# Patient Record
Sex: MALE | Race: WHITE | NOT HISPANIC OR LATINO | Employment: FULL TIME | URBAN - NONMETROPOLITAN AREA
[De-identification: names, ages, dates, MRNs, and addresses within clinical notes are randomized per-mention and may not be internally consistent; named-entity substitution may affect disease eponyms.]

---

## 2017-04-10 DIAGNOSIS — E29.1 TESTICULAR HYPOFUNCTION: Primary | ICD-10-CM

## 2017-04-10 DIAGNOSIS — N40.0 BENIGN NON-NODULAR PROSTATIC HYPERPLASIA, PRESENCE OF LOWER URINARY TRACT SYMPTOMS UNSPECIFIED: ICD-10-CM

## 2017-04-12 LAB
BASOPHILS # BLD AUTO: 0.03 10*3/MM3 (ref 0–0.2)
BASOPHILS NFR BLD AUTO: 0.6 % (ref 0–2.5)
CONV COMMENT: ABNORMAL
EOSINOPHIL # BLD AUTO: 0.24 10*3/MM3 (ref 0–0.7)
EOSINOPHIL NFR BLD AUTO: 4.6 % (ref 0–7)
ERYTHROCYTE [DISTWIDTH] IN BLOOD BY AUTOMATED COUNT: 12.7 % (ref 11.5–14.5)
ESTRADIOL SERPL-MCNC: <5 PG/ML (ref 7.6–42.6)
HCT VFR BLD AUTO: 51.3 % (ref 42–52)
HGB BLD-MCNC: 17.3 G/DL (ref 14–18)
IMM GRANULOCYTES # BLD: 0.02 10*3/MM3 (ref 0–0.06)
IMM GRANULOCYTES NFR BLD: 0.4 % (ref 0–0.6)
LYMPHOCYTES # BLD AUTO: 1.81 10*3/MM3 (ref 0.6–3.4)
LYMPHOCYTES NFR BLD AUTO: 34.6 % (ref 10–50)
MCH RBC QN AUTO: 30.7 PG (ref 27–31)
MCHC RBC AUTO-ENTMCNC: 33.7 G/DL (ref 30–37)
MCV RBC AUTO: 91 FL (ref 80–94)
MONOCYTES # BLD AUTO: 0.46 10*3/MM3 (ref 0–0.9)
MONOCYTES NFR BLD AUTO: 8.8 % (ref 0–12)
NEUTROPHILS # BLD AUTO: 2.67 10*3/MM3 (ref 2–6.9)
NEUTROPHILS NFR BLD AUTO: 51 % (ref 37–80)
NRBC BLD AUTO-RTO: 0 /100 WBC (ref 0–0)
PLATELET # BLD AUTO: 177 10*3/MM3 (ref 130–400)
PSA SERPL-MCNC: 0.59 NG/ML (ref 0.06–4)
RBC # BLD AUTO: 5.64 10*6/MM3 (ref 4.7–6.1)
TESTOST SERPL-MCNC: 150 NG/DL (ref 348–1197)
WBC # BLD AUTO: 5.23 10*3/MM3 (ref 4.8–10.8)

## 2017-04-18 ENCOUNTER — OFFICE VISIT (OUTPATIENT)
Dept: UROLOGY | Facility: CLINIC | Age: 47
End: 2017-04-18

## 2017-04-18 DIAGNOSIS — N13.8 BPH (BENIGN PROSTATIC HYPERTROPHY) WITH URINARY OBSTRUCTION: ICD-10-CM

## 2017-04-18 DIAGNOSIS — E29.1 HYPOGONADISM MALE: Primary | ICD-10-CM

## 2017-04-18 DIAGNOSIS — N40.1 BPH (BENIGN PROSTATIC HYPERTROPHY) WITH URINARY OBSTRUCTION: ICD-10-CM

## 2017-04-18 PROCEDURE — 99213 OFFICE O/P EST LOW 20 MIN: CPT | Performed by: UROLOGY

## 2017-04-18 RX ORDER — LISINOPRIL 30 MG/1
TABLET ORAL
COMMUNITY
Start: 2017-01-23

## 2017-04-18 NOTE — PROGRESS NOTES
Chief Complaint  Hypogonadism (6 MONTHS)        NIA Obregon is a 46 y.o. male who has hypogonadism and low testosterone level having received great clinical benefit from supplement and anxious to continue.  His recent blood work included a normal PSA estradiol and CBC suggesting it is safe to continue the supplement.  He is still inadequately replaced with a testosterone level of 150.    There were no vitals filed for this visit.    Past Medical History  Past Medical History:   Diagnosis Date   • Hypogonadism in male        Past Surgical History  No past surgical history on file.    Medications  has a current medication list which includes the following prescription(s): aspirin, vitamin d3, coenzyme q10, hydrochlorothiazide, lisinopril, lisinopril, omeprazole, multivitamin with fluoride/iron, rosuvastatin, sildenafil, tadalafil, and testosterone cypionate.      Allergies  Allergies   Allergen Reactions   • Penicillins Rash       Social History  Social History     Social History Narrative       Family History  He has no family history of bladder or kidney cancer  He has no family history of kidney stones      AUA Symptom Score:      Review of Systems  Review of Systems   Constitutional: Negative.    Genitourinary: Negative.    All other systems reviewed and are negative.      Physical Exam  Physical Exam    Labs Recent and today in the office:  Results for orders placed or performed in visit on 04/10/17   Testosterone   Result Value Ref Range    Testosterone, Total 150 (L) 348 - 1197 ng/dL    Comment Comment    Estradiol   Result Value Ref Range    Estradiol <5.0 (L) 7.6 - 42.6 pg/mL   PSA   Result Value Ref Range    PSA 0.591 0.060 - 4.000 ng/mL   CBC & Differential   Result Value Ref Range    WBC 5.23 4.80 - 10.80 10*3/mm3    RBC 5.64 4.70 - 6.10 10*6/mm3    Hemoglobin 17.3 14.0 - 18.0 g/dL    Hematocrit 51.3 42.0 - 52.0 %    MCV 91.0 80.0 - 94.0 fL    MCH 30.7 27.0 - 31.0 pg    MCHC 33.7 30.0 - 37.0 g/dL    RDW  12.7 11.5 - 14.5 %    Platelets 177 130 - 400 10*3/mm3    Neutrophil Rel % 51.0 37.0 - 80.0 %    Lymphocyte Rel % 34.6 10.0 - 50.0 %    Monocyte Rel % 8.8 0.0 - 12.0 %    Eosinophil Rel % 4.6 0.0 - 7.0 %    Basophil Rel % 0.6 0.0 - 2.5 %    Neutrophils Absolute 2.67 2.00 - 6.90 10*3/mm3    Lymphocytes Absolute 1.81 0.60 - 3.40 10*3/mm3    Monocytes Absolute 0.46 0.00 - 0.90 10*3/mm3    Eosinophils Absolute 0.24 0.00 - 0.70 10*3/mm3    Basophils Absolute 0.03 0.00 - 0.20 10*3/mm3    Immature Granulocyte Rel % 0.4 0.0 - 0.6 %    Immature Grans Absolute 0.02 0.00 - 0.06 10*3/mm3    nRBC 0.0 0.0 - 0.0 /100 WBC         Assessment & Plan  He obtains the supplement through mail order from the compounded pharmacy and I suggested increasing the strength to 150 mg daily.  He is also encouraged to be a blood donor since his hemoglobin is getting a little high.  If he doesn't he'll probably need a therapeutic phlebotomy on his return.

## 2017-10-17 DIAGNOSIS — R79.89 LOW TESTOSTERONE: Primary | ICD-10-CM

## 2017-10-17 DIAGNOSIS — E29.1 HYPOGONADISM IN MALE: ICD-10-CM

## 2017-10-17 DIAGNOSIS — Z12.5 SCREENING PSA (PROSTATE SPECIFIC ANTIGEN): ICD-10-CM

## 2017-10-18 ENCOUNTER — OFFICE VISIT (OUTPATIENT)
Dept: UROLOGY | Facility: CLINIC | Age: 47
End: 2017-10-18

## 2017-10-18 VITALS
HEIGHT: 72 IN | TEMPERATURE: 97.6 F | OXYGEN SATURATION: 96 % | DIASTOLIC BLOOD PRESSURE: 80 MMHG | BODY MASS INDEX: 29.66 KG/M2 | HEART RATE: 100 BPM | SYSTOLIC BLOOD PRESSURE: 140 MMHG | WEIGHT: 219 LBS

## 2017-10-18 DIAGNOSIS — E29.1 HYPOGONADISM IN MALE: Primary | ICD-10-CM

## 2017-10-18 LAB
BASOPHILS # BLD AUTO: 0.05 10*3/MM3 (ref 0–0.2)
BASOPHILS NFR BLD AUTO: 0.7 % (ref 0–2.5)
BILIRUB BLD-MCNC: NEGATIVE MG/DL
CLARITY, POC: CLEAR
COLOR UR: YELLOW
EOSINOPHIL # BLD AUTO: 0.41 10*3/MM3 (ref 0–0.7)
EOSINOPHIL NFR BLD AUTO: 5.5 % (ref 0–7)
ERYTHROCYTE [DISTWIDTH] IN BLOOD BY AUTOMATED COUNT: 13.1 % (ref 11.5–14.5)
ESTRADIOL SERPL-MCNC: 8.1 PG/ML (ref 7.6–42.6)
GLUCOSE UR STRIP-MCNC: NEGATIVE MG/DL
HCT VFR BLD AUTO: 48.5 % (ref 42–52)
HGB BLD-MCNC: 16.4 G/DL (ref 14–18)
IMM GRANULOCYTES # BLD: 0.04 10*3/MM3 (ref 0–0.06)
IMM GRANULOCYTES NFR BLD: 0.5 % (ref 0–0.6)
KETONES UR QL: NEGATIVE
LEUKOCYTE EST, POC: NEGATIVE
LYMPHOCYTES # BLD AUTO: 1.2 10*3/MM3 (ref 0.6–3.4)
LYMPHOCYTES NFR BLD AUTO: 16.2 % (ref 10–50)
MCH RBC QN AUTO: 30.4 PG (ref 27–31)
MCHC RBC AUTO-ENTMCNC: 33.8 G/DL (ref 30–37)
MCV RBC AUTO: 89.8 FL (ref 80–94)
MONOCYTES # BLD AUTO: 0.72 10*3/MM3 (ref 0–0.9)
MONOCYTES NFR BLD AUTO: 9.7 % (ref 0–12)
NEUTROPHILS # BLD AUTO: 4.99 10*3/MM3 (ref 2–6.9)
NEUTROPHILS NFR BLD AUTO: 67.4 % (ref 37–80)
NITRITE UR-MCNC: NEGATIVE MG/ML
NRBC BLD AUTO-RTO: 0 /100 WBC (ref 0–0)
PH UR: 6 [PH] (ref 5–8)
PLATELET # BLD AUTO: 174 10*3/MM3 (ref 130–400)
PROT UR STRIP-MCNC: NEGATIVE MG/DL
PSA SERPL-MCNC: 0.66 NG/ML (ref 0.06–4)
RBC # BLD AUTO: 5.4 10*6/MM3 (ref 4.7–6.1)
RBC # UR STRIP: NEGATIVE /UL
SP GR UR: 1.01 (ref 1–1.03)
TESTOST SERPL-MCNC: 520 NG/DL (ref 264–916)
UROBILINOGEN UR QL: NORMAL
WBC # BLD AUTO: 7.41 10*3/MM3 (ref 4.8–10.8)

## 2017-10-18 PROCEDURE — 99213 OFFICE O/P EST LOW 20 MIN: CPT | Performed by: UROLOGY

## 2017-10-18 PROCEDURE — 81003 URINALYSIS AUTO W/O SCOPE: CPT | Performed by: UROLOGY

## 2017-10-18 NOTE — PROGRESS NOTES
Chief Complaint  Hypogonadism (6 month follow-up)        NIA Obregon is a 46 y.o. male who returns today for follow-up of hypogonadism and low testosterone level having achieved a significant clinical benefit from supplement and anxious to continue.  He is currently using the topical compounded gel at 150 mg daily with a good result.  His testosterone increased from 120 to  520 with increase strength to 15%.  Blood work included an estradiol CBC and PSA that were all good and indicated it is safe to continue the supplement.  He has a tendency for polycythemia and donates blood to [facility].    Vitals:    10/18/17 1454   BP: 140/80   Pulse: 100   Temp: 97.6 °F (36.4 °C)   SpO2: 96%       Past Medical History  Past Medical History:   Diagnosis Date   • Hypogonadism in male        Past Surgical History  No past surgical history on file.    Medications  has a current medication list which includes the following prescription(s): aspirin, vitamin d3, coenzyme q10, hydrochlorothiazide, lisinopril, lisinopril, omeprazole, multivitamin with fluoride/iron, rosuvastatin, sildenafil, tadalafil, and testosterone cypionate.      Allergies  Allergies   Allergen Reactions   • Penicillins Rash       Social History  Social History     Social History Narrative       Family History  He has no family history of bladder or kidney cancer  He has no family history of kidney stones      AUA Symptom Score:      Review of Systems  Review of Systems    Physical Exam  Physical Exam   Constitutional: He is oriented to person, place, and time. He appears well-developed and well-nourished.   HENT:   Head: Normocephalic and atraumatic.   Neck: Normal range of motion.   Pulmonary/Chest: Effort normal. No respiratory distress.   Abdominal: Soft. He exhibits no distension and no mass. There is no tenderness. No hernia.   Genitourinary: Rectum normal and prostate normal.   Musculoskeletal: Normal range of motion.   Lymphadenopathy:      He has no cervical adenopathy.   Neurological: He is alert and oriented to person, place, and time.   Skin: Skin is warm and dry.   Psychiatric: He has a normal mood and affect. His behavior is normal.   Vitals reviewed.      Labs Recent and today in the office:  Results for orders placed or performed in visit on 10/18/17   POC Urinalysis Dipstick, Automated   Result Value Ref Range    Color Yellow Yellow, Straw, Dark Yellow, Ana    Clarity, UA Clear Clear    Glucose, UA Negative Negative, 1000 mg/dL (3+) mg/dL    Bilirubin Negative Negative    Ketones, UA Negative Negative    Specific Gravity  1.015 1.005 - 1.030    Blood, UA Negative Negative    pH, Urine 6.0 5.0 - 8.0    Protein, POC Negative Negative mg/dL    Urobilinogen, UA Normal Normal    Leukocytes Negative Negative    Nitrite, UA Negative Negative         Assessment & Plan  Hypogonadism: Is responding well to supplement and we'll continue 150 mg daily applied topically with the compounded gel.

## 2018-04-13 ENCOUNTER — LAB (OUTPATIENT)
Dept: UROLOGY | Facility: CLINIC | Age: 48
End: 2018-04-13

## 2018-04-13 DIAGNOSIS — Z12.5 SCREENING FOR PROSTATE CANCER: ICD-10-CM

## 2018-04-13 DIAGNOSIS — E29.1 TESTICULAR HYPOGONADISM: Primary | ICD-10-CM

## 2018-04-14 LAB
BASOPHILS # BLD AUTO: 0.04 10*3/MM3 (ref 0–0.2)
BASOPHILS NFR BLD AUTO: 0.7 % (ref 0–2.5)
EOSINOPHIL # BLD AUTO: 0.23 10*3/MM3 (ref 0–0.7)
EOSINOPHIL NFR BLD AUTO: 4.3 % (ref 0–7)
ERYTHROCYTE [DISTWIDTH] IN BLOOD BY AUTOMATED COUNT: 12.2 % (ref 11.5–14.5)
ESTRADIOL SERPL-MCNC: 9.9 PG/ML (ref 7.6–42.6)
HCT VFR BLD AUTO: 47.4 % (ref 42–52)
HGB BLD-MCNC: 16.4 G/DL (ref 14–18)
IMM GRANULOCYTES # BLD: 0.02 10*3/MM3 (ref 0–0.06)
IMM GRANULOCYTES NFR BLD: 0.4 % (ref 0–0.6)
LYMPHOCYTES # BLD AUTO: 1.8 10*3/MM3 (ref 0.6–3.4)
LYMPHOCYTES NFR BLD AUTO: 33.3 % (ref 10–50)
MCH RBC QN AUTO: 31.3 PG (ref 27–31)
MCHC RBC AUTO-ENTMCNC: 34.6 G/DL (ref 30–37)
MCV RBC AUTO: 90.5 FL (ref 80–94)
MONOCYTES # BLD AUTO: 0.49 10*3/MM3 (ref 0–0.9)
MONOCYTES NFR BLD AUTO: 9.1 % (ref 0–12)
NEUTROPHILS # BLD AUTO: 2.83 10*3/MM3 (ref 2–6.9)
NEUTROPHILS NFR BLD AUTO: 52.2 % (ref 37–80)
NRBC BLD AUTO-RTO: 0 /100 WBC (ref 0–0)
PLATELET # BLD AUTO: 178 10*3/MM3 (ref 130–400)
PSA SERPL-MCNC: 0.56 NG/ML (ref 0.06–4)
RBC # BLD AUTO: 5.24 10*6/MM3 (ref 4.7–6.1)
TESTOST SERPL-MCNC: 382 NG/DL (ref 264–916)
WBC # BLD AUTO: 5.41 10*3/MM3 (ref 4.8–10.8)

## 2018-04-18 ENCOUNTER — OFFICE VISIT (OUTPATIENT)
Dept: UROLOGY | Facility: CLINIC | Age: 48
End: 2018-04-18

## 2018-04-18 VITALS
SYSTOLIC BLOOD PRESSURE: 118 MMHG | DIASTOLIC BLOOD PRESSURE: 70 MMHG | OXYGEN SATURATION: 97 % | WEIGHT: 219 LBS | TEMPERATURE: 97.6 F | BODY MASS INDEX: 29.66 KG/M2 | HEIGHT: 72 IN

## 2018-04-18 DIAGNOSIS — D75.1 POLYCYTHEMIA: ICD-10-CM

## 2018-04-18 DIAGNOSIS — E29.1 HYPOGONADISM IN MALE: Primary | ICD-10-CM

## 2018-04-18 LAB
BILIRUB BLD-MCNC: NEGATIVE MG/DL
CLARITY, POC: CLEAR
COLOR UR: YELLOW
GLUCOSE UR STRIP-MCNC: NEGATIVE MG/DL
KETONES UR QL: NEGATIVE
LEUKOCYTE EST, POC: NEGATIVE
NITRITE UR-MCNC: NEGATIVE MG/ML
PH UR: 7.5 [PH] (ref 5–8)
PROT UR STRIP-MCNC: NEGATIVE MG/DL
RBC # UR STRIP: NEGATIVE /UL
SP GR UR: 1.01 (ref 1–1.03)
UROBILINOGEN UR QL: NORMAL

## 2018-04-18 PROCEDURE — 81003 URINALYSIS AUTO W/O SCOPE: CPT | Performed by: UROLOGY

## 2018-04-18 PROCEDURE — 99214 OFFICE O/P EST MOD 30 MIN: CPT | Performed by: UROLOGY

## 2018-04-18 RX ORDER — PRAVASTATIN SODIUM 40 MG
TABLET ORAL
COMMUNITY
Start: 2018-03-22 | End: 2022-10-28

## 2018-04-18 NOTE — PROGRESS NOTES
Chief Complaint  Hypogonadism (6 month follow-up )        NIA Obregon is a 47 y.o. male who returns for follow-up of hypogonadism and low testosterone level that has enjoyed a terrific clinical benefit from supplement and is anxious to continue.  He has lost 15 pounds due in part to eating a healthier diet but also has noticed a quicker recovery after exercise like 5K run.  He has a tendency for polycythemia but is a blood donor on a regular basis.  Recent blood work included a PSA and estradiol that were within normal limits along with a testosterone and hemoglobin.    Vitals:    04/18/18 1547   BP: 118/70   Temp: 97.6 °F (36.4 °C)   SpO2: 97%       Past Medical History  Past Medical History:   Diagnosis Date   • Hypogonadism in male        Past Surgical History  No past surgical history on file.    Medications  has a current medication list which includes the following prescription(s): aspirin, vitamin d3, coenzyme q10, hydrochlorothiazide, lisinopril, lisinopril, omeprazole, multivitamin with fluoride/iron, pravastatin, rosuvastatin, sildenafil, tadalafil, and testosterone cypionate.      Allergies  Allergies   Allergen Reactions   • Penicillins Rash       Social History  Social History     Social History Narrative   • No narrative on file       Family History  He has no family history of bladder or kidney cancer  He has no family history of kidney stones      AUA Symptom Score:      Review of Systems  Review of Systems   Constitutional: Negative.    Genitourinary: Negative.    All other systems reviewed and are negative.      Physical Exam  Physical Exam    Labs Recent and today in the office:  Results for orders placed or performed in visit on 04/18/18   POC Urinalysis Dipstick, Automated   Result Value Ref Range    Color Yellow Yellow, Straw, Dark Yellow, Ana    Clarity, UA Clear Clear    Glucose, UA Negative Negative, 1000 mg/dL (3+) mg/dL    Bilirubin Negative Negative    Ketones, UA Negative Negative     Specific Gravity  1.015 1.005 - 1.030    Blood, UA Negative Negative    pH, Urine 7.5 5.0 - 8.0    Protein, POC Negative Negative mg/dL    Urobilinogen, UA Normal Normal    Leukocytes Negative Negative    Nitrite, UA Negative Negative         Assessment & Plan  1 hypogonadism: The risks and benefits of androgen supplement are reviewed in detail with the patient along with the need for close monitoring for toxicity.  He'll continue the topical compounded cream to 15% and will be in need of a prescription in about 6 weeks.  Otherwise he will return with follow-up blood work in November when he'll be due for digital rectal exam.    2 polycythemia: Currently has a normal hemoglobin and hematocrit.  He'll continue to be a blood donor.

## 2018-10-22 ENCOUNTER — OFFICE VISIT (OUTPATIENT)
Dept: UROLOGY | Facility: CLINIC | Age: 48
End: 2018-10-22

## 2018-10-22 VITALS
HEIGHT: 72 IN | BODY MASS INDEX: 29.66 KG/M2 | OXYGEN SATURATION: 98 % | SYSTOLIC BLOOD PRESSURE: 134 MMHG | WEIGHT: 219 LBS | HEART RATE: 97 BPM | DIASTOLIC BLOOD PRESSURE: 80 MMHG

## 2018-10-22 DIAGNOSIS — D75.1 POLYCYTHEMIA: ICD-10-CM

## 2018-10-22 DIAGNOSIS — E29.1 TESTICULAR HYPOGONADISM: Primary | ICD-10-CM

## 2018-10-22 LAB
BILIRUB BLD-MCNC: NEGATIVE MG/DL
CLARITY, POC: CLEAR
COLOR UR: YELLOW
GLUCOSE UR STRIP-MCNC: NEGATIVE MG/DL
KETONES UR QL: NEGATIVE
LEUKOCYTE EST, POC: NEGATIVE
NITRITE UR-MCNC: NEGATIVE MG/ML
PH UR: 7 [PH] (ref 5–8)
PROT UR STRIP-MCNC: NEGATIVE MG/DL
RBC # UR STRIP: NEGATIVE /UL
SP GR UR: 1.02 (ref 1–1.03)
UROBILINOGEN UR QL: NORMAL

## 2018-10-22 PROCEDURE — 99214 OFFICE O/P EST MOD 30 MIN: CPT | Performed by: UROLOGY

## 2018-10-22 PROCEDURE — 81003 URINALYSIS AUTO W/O SCOPE: CPT | Performed by: UROLOGY

## 2018-10-22 NOTE — PROGRESS NOTES
Chief Complaint  Hypogonadism (6 month fup with labs.)        NIA Obregon is a 47 y.o. male who returns today for follow-up with a history of hypogonadism and low testosterone level.  He is achieved a great clinical benefit from supplement and is anxious to continue.  He describes his quality of life and productivity as difference between the night and day with taking testosterone.  He came by recently for blood work to ensure safety and all parameters were within normal limits.  He does have a tendency for polycythemia that is Been checked with routine blood donation.    Vitals:    10/22/18 1550   BP: 134/80   Pulse: 97   SpO2: 98%       Past Medical History  Past Medical History:   Diagnosis Date   • Hypogonadism in male        Past Surgical History  No past surgical history on file.    Medications  has a current medication list which includes the following prescription(s): aspirin, vitamin d3, coenzyme q10, hydrochlorothiazide, lisinopril, lisinopril, omeprazole, multivitamin with fluoride/iron, pravastatin, rosuvastatin, sildenafil, tadalafil, and testosterone cypionate.      Allergies  Allergies   Allergen Reactions   • Penicillins Rash       Social History  Social History     Social History Narrative   • No narrative on file       Family History  He has no family history of bladder or kidney cancer  He has no family history of kidney stones      AUA Symptom Score:      Review of Systems  Review of Systems    Physical Exam  Physical Exam   Constitutional: He is oriented to person, place, and time. He appears well-developed and well-nourished.   HENT:   Head: Normocephalic and atraumatic.   Neck: Normal range of motion.   Pulmonary/Chest: Effort normal. No respiratory distress.   Abdominal: Soft. He exhibits no distension and no mass. There is no tenderness. No hernia.   Genitourinary: Rectum normal and prostate normal.   Musculoskeletal: Normal range of motion.   Lymphadenopathy:     He has no cervical  adenopathy.   Neurological: He is alert and oriented to person, place, and time.   Skin: Skin is warm and dry.   Psychiatric: He has a normal mood and affect. His behavior is normal.   Vitals reviewed.      Labs Recent and today in the office:  Results for orders placed or performed in visit on 10/22/18   POC Urinalysis Dipstick, Automated   Result Value Ref Range    Color Yellow Yellow, Straw, Dark Yellow, Ana    Clarity, UA Clear Clear    Specific Gravity  1.020 1.005 - 1.030    pH, Urine 7.0 5.0 - 8.0    Leukocytes Negative Negative    Nitrite, UA Negative Negative    Protein, POC Negative Negative mg/dL    Glucose, UA Negative Negative, 1000 mg/dL (3+) mg/dL    Ketones, UA Negative Negative    Urobilinogen, UA Normal Normal    Bilirubin Negative Negative    Blood, UA Negative Negative         Assessment & Plan  #1 hypogonadism: He is achieved a good clinical benefit from supplement and is anxious to continue.  He understands the need to closely monitor his blood work for toxicity of therapy and unfortunately all of his numbers are within normal limits at this time.  It appears minimal risk for him to continue the supplement so it will be represcribed when due soon.  He uses topical compounded gel at 15%.    #2 polycythemia: He has avoided any difficulty by being a regular blood donor.

## 2019-04-22 ENCOUNTER — OFFICE VISIT (OUTPATIENT)
Dept: UROLOGY | Facility: CLINIC | Age: 49
End: 2019-04-22

## 2019-04-22 DIAGNOSIS — E29.1 HYPOGONADISM IN MALE: Primary | ICD-10-CM

## 2019-04-22 DIAGNOSIS — D75.1 POLYCYTHEMIA: ICD-10-CM

## 2019-04-22 LAB
BILIRUB BLD-MCNC: NEGATIVE MG/DL
CLARITY, POC: CLEAR
COLOR UR: YELLOW
GLUCOSE UR STRIP-MCNC: NEGATIVE MG/DL
KETONES UR QL: NEGATIVE
LEUKOCYTE EST, POC: NEGATIVE
NITRITE UR-MCNC: NEGATIVE MG/ML
PH UR: 6.5 [PH] (ref 5–8)
PROT UR STRIP-MCNC: NEGATIVE MG/DL
RBC # UR STRIP: NEGATIVE /UL
SP GR UR: 1.02 (ref 1–1.03)
UROBILINOGEN UR QL: NORMAL

## 2019-04-22 PROCEDURE — 81003 URINALYSIS AUTO W/O SCOPE: CPT | Performed by: UROLOGY

## 2019-04-22 PROCEDURE — 99214 OFFICE O/P EST MOD 30 MIN: CPT | Performed by: UROLOGY

## 2019-04-22 NOTE — PROGRESS NOTES
Chief Complaint  Hypogonadism (6 months follow up.)        NIA Obregon is a 48 y.o. male who returns today for follow-up of hypogonadism and low testosterone level.  He is achieved an excellent clinical response from supplement and is anxious to continue.  He uses the daily application of compounded gel at 15%.  He understands the critical need to monitor for toxicity of therapy and came by recently for blood work.  He has a tendency to develop polycythemia but is a regular blood donor and returns with a normal hematocrit.    There were no vitals filed for this visit.    Past Medical History  Past Medical History:   Diagnosis Date   • Hypogonadism in male        Past Surgical History  No past surgical history on file.    Medications  has a current medication list which includes the following prescription(s): aspirin, vitamin d3, coenzyme q10, hydrochlorothiazide, lisinopril, omeprazole, multivitamin with fluoride/iron, pravastatin, rosuvastatin, sildenafil, tadalafil, testosterone cypionate, and lisinopril.      Allergies  Allergies   Allergen Reactions   • Penicillins Rash       Social History  Social History     Social History Narrative   • Not on file       Family History  He has no family history of bladder or kidney cancer  He has no family history of kidney stones      AUA Symptom Score:      Review of Systems  Review of Systems   Constitutional: Negative.    Genitourinary: Negative.    All other systems reviewed and are negative.      Physical Exam  Physical Exam    Labs Recent and today in the office:  Results for orders placed or performed in visit on 04/22/19   POC Urinalysis Dipstick, Automated   Result Value Ref Range    Color Yellow Yellow, Straw, Dark Yellow, Ana    Clarity, UA Clear Clear    Specific Gravity  1.020 1.005 - 1.030    pH, Urine 6.5 5.0 - 8.0    Leukocytes Negative Negative    Nitrite, UA Negative Negative    Protein, POC Negative Negative mg/dL    Glucose, UA Negative Negative, 1000  mg/dL (3+) mg/dL    Ketones, UA Negative Negative    Urobilinogen, UA Normal Normal    Bilirubin Negative Negative    Blood, UA Negative Negative         Assessment & Plan  Hypogonadism: He has achieved an excellent clinical benefit from daily application of 15% compounded gel and is anxious to continue with supplement.  He understands the critical need to monitor for toxicity of therapy and will return in 6 months for WAYNE with blood work.    Polycythemia: He tends to develop this with testosterone supplement but it is easily handled by a regular donation of his blood to the [facility].

## 2019-10-14 ENCOUNTER — RESULTS ENCOUNTER (OUTPATIENT)
Dept: UROLOGY | Facility: CLINIC | Age: 49
End: 2019-10-14

## 2019-10-14 DIAGNOSIS — E29.1 HYPOGONADISM IN MALE: ICD-10-CM

## 2019-10-14 DIAGNOSIS — Z12.5 SCREENING PSA (PROSTATE SPECIFIC ANTIGEN): ICD-10-CM

## 2019-10-19 LAB
BASOPHILS # BLD AUTO: 0.05 10*3/MM3 (ref 0–0.2)
BASOPHILS NFR BLD AUTO: 0.7 % (ref 0–1.5)
EOSINOPHIL # BLD AUTO: 0.25 10*3/MM3 (ref 0–0.4)
EOSINOPHIL NFR BLD AUTO: 3.5 % (ref 0.3–6.2)
ERYTHROCYTE [DISTWIDTH] IN BLOOD BY AUTOMATED COUNT: 12.5 % (ref 12.3–15.4)
ESTRADIOL SERPL-MCNC: 18.7 PG/ML (ref 7.6–42.6)
HCT VFR BLD AUTO: 42.2 % (ref 37.5–51)
HGB BLD-MCNC: 14.7 G/DL (ref 13–17.7)
IMM GRANULOCYTES # BLD AUTO: 0.03 10*3/MM3 (ref 0–0.05)
IMM GRANULOCYTES NFR BLD AUTO: 0.4 % (ref 0–0.5)
LYMPHOCYTES # BLD AUTO: 2.32 10*3/MM3 (ref 0.7–3.1)
LYMPHOCYTES NFR BLD AUTO: 32.1 % (ref 19.6–45.3)
MCH RBC QN AUTO: 30.8 PG (ref 26.6–33)
MCHC RBC AUTO-ENTMCNC: 34.8 G/DL (ref 31.5–35.7)
MCV RBC AUTO: 88.3 FL (ref 79–97)
MONOCYTES # BLD AUTO: 0.62 10*3/MM3 (ref 0.1–0.9)
MONOCYTES NFR BLD AUTO: 8.6 % (ref 5–12)
NEUTROPHILS # BLD AUTO: 3.96 10*3/MM3 (ref 1.7–7)
NEUTROPHILS NFR BLD AUTO: 54.7 % (ref 42.7–76)
NRBC BLD AUTO-RTO: 0 /100 WBC (ref 0–0.2)
PLATELET # BLD AUTO: 206 10*3/MM3 (ref 140–450)
PSA SERPL-MCNC: 0.5 NG/ML (ref 0–4)
RBC # BLD AUTO: 4.78 10*6/MM3 (ref 4.14–5.8)
TESTOST SERPL-MCNC: 369 NG/DL (ref 264–916)
WBC # BLD AUTO: 7.23 10*3/MM3 (ref 3.4–10.8)

## 2019-10-22 ENCOUNTER — OFFICE VISIT (OUTPATIENT)
Dept: UROLOGY | Facility: CLINIC | Age: 49
End: 2019-10-22

## 2019-10-22 VITALS
DIASTOLIC BLOOD PRESSURE: 90 MMHG | HEART RATE: 66 BPM | WEIGHT: 219 LBS | HEIGHT: 72 IN | SYSTOLIC BLOOD PRESSURE: 125 MMHG | RESPIRATION RATE: 19 BRPM | OXYGEN SATURATION: 99 % | BODY MASS INDEX: 29.66 KG/M2

## 2019-10-22 DIAGNOSIS — N52.9 ERECTILE DYSFUNCTION, UNSPECIFIED ERECTILE DYSFUNCTION TYPE: ICD-10-CM

## 2019-10-22 DIAGNOSIS — E29.1 HYPOGONADISM IN MALE: Primary | ICD-10-CM

## 2019-10-22 DIAGNOSIS — Z12.5 SCREENING PSA (PROSTATE SPECIFIC ANTIGEN): ICD-10-CM

## 2019-10-22 LAB
BILIRUB BLD-MCNC: NEGATIVE MG/DL
CLARITY, POC: CLEAR
COLOR UR: YELLOW
GLUCOSE UR STRIP-MCNC: NEGATIVE MG/DL
KETONES UR QL: NEGATIVE
LEUKOCYTE EST, POC: NEGATIVE
NITRITE UR-MCNC: NEGATIVE MG/ML
PH UR: 6.5 [PH] (ref 5–8)
PROT UR STRIP-MCNC: NEGATIVE MG/DL
RBC # UR STRIP: NEGATIVE /UL
SP GR UR: 1.01 (ref 1–1.03)
UROBILINOGEN UR QL: NORMAL

## 2019-10-22 PROCEDURE — 99214 OFFICE O/P EST MOD 30 MIN: CPT | Performed by: UROLOGY

## 2019-10-22 PROCEDURE — 81003 URINALYSIS AUTO W/O SCOPE: CPT | Performed by: UROLOGY

## 2019-10-22 RX ORDER — AMLODIPINE BESYLATE 5 MG/1
TABLET ORAL
COMMUNITY
Start: 2019-10-02

## 2019-10-22 RX ORDER — SILDENAFIL CITRATE 20 MG/1
TABLET ORAL
Qty: 30 TABLET | Refills: 5 | Status: SHIPPED | OUTPATIENT
Start: 2019-10-22 | End: 2021-01-04

## 2019-10-22 NOTE — PROGRESS NOTES
Chief Complaint  Follow-up (6 month )        NIA Obregon is a 48 y.o. male who returns today for follow-up of hypogonadism and low testosterone currently being treated with topical compounded gel 15% to provide 150 mg daily.  He is enjoyed a good clinical benefit in terms of strength stamina energy and attitude and is anxious to continue.  He understands the need to monitor for toxicity of therapy and came by recently for blood work.  Fortunately all parameters are nominal.  He has a periodicblood donor and this helps prevent polycythemia.  He is requesting some help with his erectile dysfunction.    Vitals:    10/22/19 1523   BP: 125/90   Pulse: 66   Resp: 19   SpO2: 99%       Past Medical History  Past Medical History:   Diagnosis Date   • Hypogonadism in male        Past Surgical History  No past surgical history on file.    Medications  has a current medication list which includes the following prescription(s): aspirin, vitamin d3, coenzyme q10, hydrochlorothiazide, lisinopril, lisinopril, omeprazole, multivitamin with fluoride/iron, pravastatin, rosuvastatin, sildenafil, tadalafil, testosterone cypionate, and amlodipine.      Allergies  Allergies   Allergen Reactions   • Penicillins Rash       Social History  Social History     Socioeconomic History   • Marital status:      Spouse name: Not on file   • Number of children: Not on file   • Years of education: Not on file   • Highest education level: Not on file   Occupational History     Employer: [employer]   Tobacco Use   • Smoking status: Never Smoker   • Smokeless tobacco: Never Used   Substance and Sexual Activity   • Alcohol use: No   • Drug use: No   • Sexual activity: Defer       Family History  He has no family history of bladder or kidney cancer  He has no family history of kidney stones      AUA Symptom Score:      Review of Systems  Review of Systems   Constitutional: Positive for fatigue. Negative for activity change,  appetite change, chills, fever, unexpected weight gain and unexpected weight loss.   Respiratory: Negative for apnea, cough, chest tightness, shortness of breath, wheezing and stridor.    Cardiovascular: Negative for chest pain, palpitations and leg swelling.   Gastrointestinal: Negative for abdominal distention, abdominal pain, anal bleeding, blood in stool, constipation, diarrhea, nausea, rectal pain, vomiting, GERD and indigestion.   Genitourinary: Positive for erectile dysfunction. Negative for decreased libido, decreased urine volume, difficulty urinating, discharge, dysuria, flank pain, frequency, genital sores, hematuria, nocturia, penile pain, penile swelling, scrotal swelling, testicular pain, urgency and urinary incontinence.   Musculoskeletal: Negative for back pain and joint swelling.   Neurological: Negative for tremors, seizures, speech difficulty, weakness and numbness.   Psychiatric/Behavioral: Negative for agitation, decreased concentration, sleep disturbance, depressed mood and stress. The patient is not nervous/anxious.        Physical Exam  Physical Exam   Constitutional: He is oriented to person, place, and time. He appears well-developed and well-nourished.   HENT:   Head: Normocephalic and atraumatic.   Neck: Normal range of motion.   Pulmonary/Chest: Effort normal. No respiratory distress.   Abdominal: Soft. He exhibits no distension and no mass. There is no tenderness. No hernia.   Genitourinary: Rectum normal and prostate normal.   Musculoskeletal: Normal range of motion.   Lymphadenopathy:     He has no cervical adenopathy.   Neurological: He is alert and oriented to person, place, and time.   Skin: Skin is warm and dry.   Psychiatric: He has a normal mood and affect. His behavior is normal.   Vitals reviewed.      Labs Recent and today in the office:  Results for orders placed or performed in visit on 10/14/19   Testosterone   Result Value Ref Range    Testosterone, Total 369 264 - 916  ng/dL   Estradiol   Result Value Ref Range    Estradiol 18.7 7.6 - 42.6 pg/mL   PSA Screen   Result Value Ref Range    PSA 0.496 0.000 - 4.000 ng/mL   CBC & Differential   Result Value Ref Range    WBC 7.23 3.40 - 10.80 10*3/mm3    RBC 4.78 4.14 - 5.80 10*6/mm3    Hemoglobin 14.7 13.0 - 17.7 g/dL    Hematocrit 42.2 37.5 - 51.0 %    MCV 88.3 79.0 - 97.0 fL    MCH 30.8 26.6 - 33.0 pg    MCHC 34.8 31.5 - 35.7 g/dL    RDW 12.5 12.3 - 15.4 %    Platelets 206 140 - 450 10*3/mm3    Neutrophil Rel % 54.7 42.7 - 76.0 %    Lymphocyte Rel % 32.1 19.6 - 45.3 %    Monocyte Rel % 8.6 5.0 - 12.0 %    Eosinophil Rel % 3.5 0.3 - 6.2 %    Basophil Rel % 0.7 0.0 - 1.5 %    Neutrophils Absolute 3.96 1.70 - 7.00 10*3/mm3    Lymphocytes Absolute 2.32 0.70 - 3.10 10*3/mm3    Monocytes Absolute 0.62 0.10 - 0.90 10*3/mm3    Eosinophils Absolute 0.25 0.00 - 0.40 10*3/mm3    Basophils Absolute 0.05 0.00 - 0.20 10*3/mm3    Immature Granulocyte Rel % 0.4 0.0 - 0.5 %    Immature Grans Absolute 0.03 0.00 - 0.05 10*3/mm3    nRBC 0.0 0.0 - 0.2 /100 WBC         Assessment & Plan  Hypogonadism: Currently being treated with good effect with a 15% compounded gel and he is anxious to continue.  He understands the need to monitor for toxicity of therapy and came by recently for blood work.  All factors were nominal including his estradiol PSA and hemoglobin.  He has a tendency for polycythemia and this is handled by periodic blood donation.    Erectile dysfunction: He is requesting prescription for sildenafil which is provided.

## 2020-04-22 ENCOUNTER — TELEMEDICINE (OUTPATIENT)
Dept: UROLOGY | Facility: CLINIC | Age: 50
End: 2020-04-22

## 2020-04-22 DIAGNOSIS — E29.1 HYPOGONADISM IN MALE: Primary | ICD-10-CM

## 2020-04-22 DIAGNOSIS — N52.9 ERECTILE DYSFUNCTION, UNSPECIFIED ERECTILE DYSFUNCTION TYPE: ICD-10-CM

## 2020-04-22 PROCEDURE — 99214 OFFICE O/P EST MOD 30 MIN: CPT | Performed by: UROLOGY

## 2020-04-22 NOTE — PROGRESS NOTES
Chief Complaint  No chief complaint on file.    Hypogonadism    HPI  Mindi is a 49 y.o. male who returns today by way of video conference in light of the COVID-19 restrictions.  He has a history of hypogonadism and has enjoyed a terrific clinical benefit from supplement and is anxious to continue.  He is currently using the topical compounded gel at 15% providing 150 mg daily.  He understands the need to monitor for toxicity of therapy and came by recently for follow-up blood work.  He has a tendency to develop polycythemia but this is under good control with his voluntary periodic blood donations.  His estrogen level remains within normal limits.  Unfortunately his testosterone is still inadequately replaced at the 15% concentration.  He is currently involved in a very vigorous exercise program and combined with diet he is looking forward to additional weight loss.  Is noticed his recovery is much improved on the testosterone supplement after a vigorous workout.    On the last visit he had requested help with erectile dysfunction and is enjoyed significant benefit from low-dose sildenafil stating he does not always need it.    There were no vitals filed for this visit.    Past Medical History  Past Medical History:   Diagnosis Date   • Hypogonadism in male        Past Surgical History  No past surgical history on file.    Medications  has a current medication list which includes the following prescription(s): amlodipine, aspirin, vitamin d3, coenzyme q10, hydrochlorothiazide, lisinopril, lisinopril, omeprazole, multivitamin with fluoride/iron, pravastatin, rosuvastatin, sildenafil, and testosterone cypionate.      Allergies  Allergies   Allergen Reactions   • Penicillins Rash       Social History  Social History     Socioeconomic History   • Marital status:      Spouse name: Not on file   • Number of children: Not on file   • Years of education: Not on file   • Highest education level: Not on file    Occupational History     Employer: [employer], [place]   Tobacco Use   • Smoking status: Never Smoker   • Smokeless tobacco: Never Used   Substance and Sexual Activity   • Alcohol use: No   • Drug use: No   • Sexual activity: Defer       Family History  He has no family history of bladder or kidney cancer  He has no family history of kidney stones      AUA Symptom Score:      Review of Systems  Review of Systems   Constitutional: Negative for activity change, appetite change, chills, fatigue, fever, unexpected weight gain and unexpected weight loss.   Respiratory: Negative for apnea, cough, chest tightness, shortness of breath, wheezing and stridor.    Cardiovascular: Negative for chest pain, palpitations and leg swelling.   Gastrointestinal: Negative for abdominal distention, abdominal pain, anal bleeding, blood in stool, constipation, diarrhea, nausea, rectal pain, vomiting, GERD and indigestion.   Genitourinary: Negative for decreased libido, decreased urine volume, difficulty urinating, discharge, dysuria, flank pain, frequency, genital sores, hematuria, nocturia, penile pain, erectile dysfunction, penile swelling, scrotal swelling, testicular pain, urgency and urinary incontinence.   Musculoskeletal: Negative for back pain and joint swelling.   Neurological: Negative for tremors, seizures, speech difficulty, weakness and numbness.   Psychiatric/Behavioral: Negative for agitation, decreased concentration, sleep disturbance, depressed mood and stress. The patient is not nervous/anxious.        Physical Exam  Physical Exam   Constitutional: He is oriented to person, place, and time. He appears well-developed and well-nourished.   HENT:   Head: Normocephalic and atraumatic.   Neck: Normal range of motion.   Pulmonary/Chest: Effort normal. No respiratory distress.   Abdominal: He exhibits no distension and no mass. There is no tenderness. No hernia.   Musculoskeletal: Normal range of motion.    Lymphadenopathy:     He has no cervical adenopathy.   Neurological: He is alert and oriented to person, place, and time.   Skin: Skin is warm and dry.   Psychiatric: He has a normal mood and affect. His behavior is normal.   Vitals reviewed.      Labs Recent and today in the office:  Results for orders placed or performed in visit on 10/22/19   POC Urinalysis Dipstick, Automated   Result Value Ref Range    Color Yellow Yellow, Straw, Dark Yellow, Ana    Clarity, UA Clear Clear    Specific Gravity  1.010 1.005 - 1.030    pH, Urine 6.5 5.0 - 8.0    Leukocytes Negative Negative    Nitrite, UA Negative Negative    Protein, POC Negative Negative mg/dL    Glucose, UA Negative Negative, 1000 mg/dL (3+) mg/dL    Ketones, UA Negative Negative    Urobilinogen, UA Normal Normal    Bilirubin Negative Negative    Blood, UA Negative Negative         Assessment & Plan  Hypogonadism: He is achieved significant clinical benefit from supplement and is anxious to continue.  He is slightly under replaced with the topical compounded gel at 15% so we discussed increasing this to 17%.  He understands the need to continue to monitor closely for toxicity of therapy and will return again in 3 months for follow-up in light of this change.

## 2020-06-16 ENCOUNTER — TRANSCRIBE ORDERS (OUTPATIENT)
Dept: ADMINISTRATIVE | Facility: HOSPITAL | Age: 50
End: 2020-06-16

## 2020-06-16 ENCOUNTER — LAB (OUTPATIENT)
Dept: LAB | Facility: HOSPITAL | Age: 50
End: 2020-06-16

## 2020-06-16 DIAGNOSIS — Z12.5 SPECIAL SCREENING FOR MALIGNANT NEOPLASM OF PROSTATE: ICD-10-CM

## 2020-06-16 DIAGNOSIS — Z12.5 SPECIAL SCREENING FOR MALIGNANT NEOPLASM OF PROSTATE: Primary | ICD-10-CM

## 2020-06-16 LAB
ALBUMIN SERPL-MCNC: 4.7 G/DL (ref 3.5–5.2)
ALBUMIN/GLOB SERPL: 1.8 G/DL
ALP SERPL-CCNC: 66 U/L (ref 39–117)
ALT SERPL W P-5'-P-CCNC: 36 U/L (ref 1–41)
ANION GAP SERPL CALCULATED.3IONS-SCNC: 11.3 MMOL/L (ref 5–15)
AST SERPL-CCNC: 25 U/L (ref 1–40)
BILIRUB SERPL-MCNC: 0.7 MG/DL (ref 0.2–1.2)
BUN BLD-MCNC: 11 MG/DL (ref 6–20)
BUN/CREAT SERPL: 9.6 (ref 7–25)
CALCIUM SPEC-SCNC: 9.3 MG/DL (ref 8.6–10.5)
CHLORIDE SERPL-SCNC: 102 MMOL/L (ref 98–107)
CO2 SERPL-SCNC: 25.7 MMOL/L (ref 22–29)
CREAT BLD-MCNC: 1.15 MG/DL (ref 0.76–1.27)
DEPRECATED RDW RBC AUTO: 40.8 FL (ref 37–54)
ERYTHROCYTE [DISTWIDTH] IN BLOOD BY AUTOMATED COUNT: 13.2 % (ref 12.3–15.4)
GFR SERPL CREATININE-BSD FRML MDRD: 68 ML/MIN/1.73
GLOBULIN UR ELPH-MCNC: 2.6 GM/DL
GLUCOSE BLD-MCNC: 92 MG/DL (ref 65–99)
HCT VFR BLD AUTO: 42.8 % (ref 37.5–51)
HGB BLD-MCNC: 14.7 G/DL (ref 13–17.7)
MCH RBC QN AUTO: 28.9 PG (ref 26.6–33)
MCHC RBC AUTO-ENTMCNC: 34.3 G/DL (ref 31.5–35.7)
MCV RBC AUTO: 84.3 FL (ref 79–97)
PLATELET # BLD AUTO: 180 10*3/MM3 (ref 140–450)
PMV BLD AUTO: 11.8 FL (ref 6–12)
POTASSIUM BLD-SCNC: 4.7 MMOL/L (ref 3.5–5.2)
PROT SERPL-MCNC: 7.3 G/DL (ref 6–8.5)
PSA SERPL-MCNC: 0.71 NG/ML (ref 0–4)
RBC # BLD AUTO: 5.08 10*6/MM3 (ref 4.14–5.8)
SODIUM BLD-SCNC: 139 MMOL/L (ref 136–145)
WBC NRBC COR # BLD: 5.61 10*3/MM3 (ref 3.4–10.8)

## 2020-06-16 PROCEDURE — 85027 COMPLETE CBC AUTOMATED: CPT

## 2020-06-16 PROCEDURE — 84153 ASSAY OF PSA TOTAL: CPT

## 2020-06-16 PROCEDURE — 36415 COLL VENOUS BLD VENIPUNCTURE: CPT

## 2020-06-16 PROCEDURE — 80053 COMPREHEN METABOLIC PANEL: CPT

## 2020-07-20 ENCOUNTER — LAB (OUTPATIENT)
Dept: UROLOGY | Facility: CLINIC | Age: 50
End: 2020-07-20

## 2020-07-20 DIAGNOSIS — E29.1 HYPOGONADISM IN MALE: Primary | ICD-10-CM

## 2020-07-21 LAB
BASOPHILS # BLD AUTO: 0.04 10*3/MM3 (ref 0–0.2)
BASOPHILS NFR BLD AUTO: 0.8 % (ref 0–1.5)
EOSINOPHIL # BLD AUTO: 0.23 10*3/MM3 (ref 0–0.4)
EOSINOPHIL NFR BLD AUTO: 4.4 % (ref 0.3–6.2)
ERYTHROCYTE [DISTWIDTH] IN BLOOD BY AUTOMATED COUNT: 12.9 % (ref 12.3–15.4)
ESTRADIOL SERPL-MCNC: 8.5 PG/ML (ref 7.6–42.6)
HCT VFR BLD AUTO: 39.6 % (ref 37.5–51)
HGB BLD-MCNC: 13.8 G/DL (ref 13–17.7)
IMM GRANULOCYTES # BLD AUTO: 0.02 10*3/MM3 (ref 0–0.05)
IMM GRANULOCYTES NFR BLD AUTO: 0.4 % (ref 0–0.5)
LYMPHOCYTES # BLD AUTO: 1.67 10*3/MM3 (ref 0.7–3.1)
LYMPHOCYTES NFR BLD AUTO: 31.9 % (ref 19.6–45.3)
MCH RBC QN AUTO: 29 PG (ref 26.6–33)
MCHC RBC AUTO-ENTMCNC: 34.8 G/DL (ref 31.5–35.7)
MCV RBC AUTO: 83.2 FL (ref 79–97)
MONOCYTES # BLD AUTO: 0.52 10*3/MM3 (ref 0.1–0.9)
MONOCYTES NFR BLD AUTO: 9.9 % (ref 5–12)
NEUTROPHILS # BLD AUTO: 2.76 10*3/MM3 (ref 1.7–7)
NEUTROPHILS NFR BLD AUTO: 52.6 % (ref 42.7–76)
NRBC BLD AUTO-RTO: 0 /100 WBC (ref 0–0.2)
PLATELET # BLD AUTO: 210 10*3/MM3 (ref 140–450)
RBC # BLD AUTO: 4.76 10*6/MM3 (ref 4.14–5.8)
TESTOST SERPL-MCNC: 419 NG/DL (ref 264–916)
WBC # BLD AUTO: 5.24 10*3/MM3 (ref 3.4–10.8)

## 2020-07-23 ENCOUNTER — OFFICE VISIT (OUTPATIENT)
Dept: UROLOGY | Facility: CLINIC | Age: 50
End: 2020-07-23

## 2020-07-23 VITALS
OXYGEN SATURATION: 99 % | HEART RATE: 74 BPM | RESPIRATION RATE: 16 BRPM | DIASTOLIC BLOOD PRESSURE: 72 MMHG | SYSTOLIC BLOOD PRESSURE: 130 MMHG | TEMPERATURE: 98.2 F

## 2020-07-23 DIAGNOSIS — E29.1 HYPOGONADISM IN MALE: Primary | ICD-10-CM

## 2020-07-23 DIAGNOSIS — D75.1 POLYCYTHEMIA: ICD-10-CM

## 2020-07-23 PROCEDURE — 99214 OFFICE O/P EST MOD 30 MIN: CPT | Performed by: UROLOGY

## 2020-07-23 NOTE — PROGRESS NOTES
Chief Complaint  Hypogonadism        NIA Obregon is a 49 y.o. male who returns today for follow-up of hypogonadism having achieved an excellent benefit from supplement and anxious to continue.  On his last encounter we increased his topical compounded gel from 15 to 17% and his testosterone is now fully corrected.  He understands the need to monitor for toxicity of therapy and came by recently for blood work.  His estrogen level has always been normal without anastrozole.  He does have a tendency for polycythemia and is a regular blood donor to keep this under control.    Vitals:    07/23/20 1431   BP: 130/72   Pulse: 74   Resp: 16   Temp: 98.2 °F (36.8 °C)   SpO2: 99%       Past Medical History  Past Medical History:   Diagnosis Date   • Hypogonadism in male        Past Surgical History  No past surgical history on file.    Medications  has a current medication list which includes the following prescription(s): amlodipine, aspirin, vitamin d3, coenzyme q10, hydrochlorothiazide, lisinopril, omeprazole, multivitamin with fluoride/iron, pravastatin, rosuvastatin, sildenafil, testosterone cypionate, and lisinopril.      Allergies  Allergies   Allergen Reactions   • Penicillins Rash       Social History  Social History     Socioeconomic History   • Marital status:      Spouse name: Not on file   • Number of children: Not on file   • Years of education: Not on file   • Highest education level: Not on file   Occupational History     Employer: [employer]   Tobacco Use   • Smoking status: Never Smoker   • Smokeless tobacco: Never Used   Substance and Sexual Activity   • Alcohol use: No   • Drug use: No   • Sexual activity: Defer       Family History  He has no family history of bladder or kidney cancer  He has no family history of kidney stones      AUA Symptom Score:      Review of Systems  Review of Systems   Constitutional: Negative for activity change, appetite change, chills, fatigue, fever,  unexpected weight gain and unexpected weight loss.   Respiratory: Negative for apnea, cough, chest tightness, shortness of breath, wheezing and stridor.    Cardiovascular: Negative for chest pain, palpitations and leg swelling.   Gastrointestinal: Negative for abdominal distention, abdominal pain, anal bleeding, blood in stool, constipation, diarrhea, nausea, rectal pain, vomiting, GERD and indigestion.   Genitourinary: Negative for decreased libido, decreased urine volume, difficulty urinating, discharge, dysuria, flank pain, frequency, genital sores, hematuria, nocturia, penile pain, erectile dysfunction, penile swelling, scrotal swelling, testicular pain, urgency and urinary incontinence.   Musculoskeletal: Negative for back pain and joint swelling.   Neurological: Negative for tremors, seizures, speech difficulty, weakness and numbness.   Psychiatric/Behavioral: Negative for agitation, decreased concentration, sleep disturbance, depressed mood and stress. The patient is not nervous/anxious.        Physical Exam  Physical Exam   Constitutional: He is oriented to person, place, and time. He appears well-developed and well-nourished.   HENT:   Head: Normocephalic and atraumatic.   Neck: Normal range of motion.   Pulmonary/Chest: Effort normal. No respiratory distress.   Abdominal: Soft. He exhibits no distension and no mass. There is no tenderness. No hernia.   Musculoskeletal: Normal range of motion.   Lymphadenopathy:     He has no cervical adenopathy.   Neurological: He is alert and oriented to person, place, and time.   Skin: Skin is warm and dry.   Psychiatric: He has a normal mood and affect. His behavior is normal.   Vitals reviewed.      Labs Recent and today in the office:  Results for orders placed or performed in visit on 07/20/20   Testosterone   Result Value Ref Range    Testosterone, Total 419 264 - 916 ng/dL   Estradiol   Result Value Ref Range    Estradiol 8.5 7.6 - 42.6 pg/mL   CBC & Differential    Result Value Ref Range    WBC 5.24 3.40 - 10.80 10*3/mm3    RBC 4.76 4.14 - 5.80 10*6/mm3    Hemoglobin 13.8 13.0 - 17.7 g/dL    Hematocrit 39.6 37.5 - 51.0 %    MCV 83.2 79.0 - 97.0 fL    MCH 29.0 26.6 - 33.0 pg    MCHC 34.8 31.5 - 35.7 g/dL    RDW 12.9 12.3 - 15.4 %    Platelets 210 140 - 450 10*3/mm3    Neutrophil Rel % 52.6 42.7 - 76.0 %    Lymphocyte Rel % 31.9 19.6 - 45.3 %    Monocyte Rel % 9.9 5.0 - 12.0 %    Eosinophil Rel % 4.4 0.3 - 6.2 %    Basophil Rel % 0.8 0.0 - 1.5 %    Neutrophils Absolute 2.76 1.70 - 7.00 10*3/mm3    Lymphocytes Absolute 1.67 0.70 - 3.10 10*3/mm3    Monocytes Absolute 0.52 0.10 - 0.90 10*3/mm3    Eosinophils Absolute 0.23 0.00 - 0.40 10*3/mm3    Basophils Absolute 0.04 0.00 - 0.20 10*3/mm3    Immature Granulocyte Rel % 0.4 0.0 - 0.5 %    Immature Grans Absolute 0.02 0.00 - 0.05 10*3/mm3    nRBC 0.0 0.0 - 0.2 /100 WBC         Assessment & Plan  Hypogonadism: He understands the need to monitor for toxicity of therapy and will return in 3 months with follow-up blood work.  He will continue to be a blood donor to prevent polycythemia and continue the topical application of 17% compounded gel for 170 mg of testosterone daily.  He will return in October for digital rectal exam.

## 2020-10-19 ENCOUNTER — LAB (OUTPATIENT)
Dept: UROLOGY | Facility: CLINIC | Age: 50
End: 2020-10-19

## 2020-10-19 DIAGNOSIS — E29.1 HYPOGONADISM IN MALE: Primary | ICD-10-CM

## 2020-10-20 LAB
BASOPHILS # BLD AUTO: 0.06 10*3/MM3 (ref 0–0.2)
BASOPHILS NFR BLD AUTO: 1.1 % (ref 0–1.5)
EOSINOPHIL # BLD AUTO: 0.26 10*3/MM3 (ref 0–0.4)
EOSINOPHIL NFR BLD AUTO: 4.7 % (ref 0.3–6.2)
ERYTHROCYTE [DISTWIDTH] IN BLOOD BY AUTOMATED COUNT: 13.5 % (ref 12.3–15.4)
ESTRADIOL SERPL-MCNC: <5 PG/ML (ref 7.6–42.6)
HCT VFR BLD AUTO: 37.7 % (ref 37.5–51)
HGB BLD-MCNC: 12.5 G/DL (ref 13–17.7)
IMM GRANULOCYTES # BLD AUTO: 0.02 10*3/MM3 (ref 0–0.05)
IMM GRANULOCYTES NFR BLD AUTO: 0.4 % (ref 0–0.5)
LYMPHOCYTES # BLD AUTO: 1.73 10*3/MM3 (ref 0.7–3.1)
LYMPHOCYTES NFR BLD AUTO: 31.5 % (ref 19.6–45.3)
MCH RBC QN AUTO: 26.7 PG (ref 26.6–33)
MCHC RBC AUTO-ENTMCNC: 33.2 G/DL (ref 31.5–35.7)
MCV RBC AUTO: 80.4 FL (ref 79–97)
MONOCYTES # BLD AUTO: 0.59 10*3/MM3 (ref 0.1–0.9)
MONOCYTES NFR BLD AUTO: 10.7 % (ref 5–12)
NEUTROPHILS # BLD AUTO: 2.83 10*3/MM3 (ref 1.7–7)
NEUTROPHILS NFR BLD AUTO: 51.6 % (ref 42.7–76)
NRBC BLD AUTO-RTO: 0 /100 WBC (ref 0–0.2)
PLATELET # BLD AUTO: 212 10*3/MM3 (ref 140–450)
RBC # BLD AUTO: 4.69 10*6/MM3 (ref 4.14–5.8)
TESTOST SERPL-MCNC: 169 NG/DL (ref 264–916)
WBC # BLD AUTO: 5.49 10*3/MM3 (ref 3.4–10.8)

## 2020-10-22 ENCOUNTER — OFFICE VISIT (OUTPATIENT)
Dept: UROLOGY | Facility: CLINIC | Age: 50
End: 2020-10-22

## 2020-10-22 VITALS
TEMPERATURE: 97.7 F | HEART RATE: 91 BPM | OXYGEN SATURATION: 100 % | RESPIRATION RATE: 16 BRPM | SYSTOLIC BLOOD PRESSURE: 136 MMHG | DIASTOLIC BLOOD PRESSURE: 72 MMHG

## 2020-10-22 DIAGNOSIS — E29.1 HYPOGONADISM IN MALE: Primary | ICD-10-CM

## 2020-10-22 DIAGNOSIS — D75.1 POLYCYTHEMIA: ICD-10-CM

## 2020-10-22 PROCEDURE — 99214 OFFICE O/P EST MOD 30 MIN: CPT | Performed by: UROLOGY

## 2020-10-22 NOTE — PROGRESS NOTES
Chief Complaint  Hypogonadism (follow up with labs.)        NIA Obregon is a 49 y.o. male who returns today for follow-up of hypogonadism having received a terrific benefit from supplement and anxious to continue.  We increased the concentration of his compounded topical gel from 15 to 17% and his testosterone was in a good range at 419.  During the last interval he continues to feel well but his latest blood work indicated some decrease in the level.  Estrogen was still normal and hemoglobin was acceptable but he continues to be a regular blood donor.  He denies any difficulty voiding.    Vitals:    10/22/20 1402   BP: 136/72   Pulse: 91   Resp: 16   Temp: 97.7 °F (36.5 °C)   SpO2: 100%       Past Medical History  Past Medical History:   Diagnosis Date   • Hypogonadism in male        Past Surgical History  History reviewed. No pertinent surgical history.    Medications  has a current medication list which includes the following prescription(s): amlodipine, aspirin, vitamin d3, coenzyme q10, hydrochlorothiazide, lisinopril, omeprazole, multivitamin with fluoride/iron, pravastatin, rosuvastatin, sildenafil, and testosterone cypionate.      Allergies  Allergies   Allergen Reactions   • Penicillins Rash       Social History  Social History     Socioeconomic History   • Marital status:      Spouse name: Not on file   • Number of children: Not on file   • Years of education: Not on file   • Highest education level: Not on file   Occupational History     Employer: [employer]   Tobacco Use   • Smoking status: Never Smoker   • Smokeless tobacco: Never Used   Substance and Sexual Activity   • Alcohol use: No   • Drug use: No   • Sexual activity: Defer       Family History  He has no family history of bladder or kidney cancer  He has no family history of kidney stones      AUA Symptom Score:      Review of Systems  Review of Systems   Constitutional: Negative for activity change, appetite change,  chills, fatigue, fever, unexpected weight gain and unexpected weight loss.   Respiratory: Negative for apnea, cough, chest tightness, shortness of breath, wheezing and stridor.    Cardiovascular: Negative for chest pain, palpitations and leg swelling.   Gastrointestinal: Negative for abdominal distention, abdominal pain, anal bleeding, blood in stool, constipation, diarrhea, nausea, rectal pain, vomiting, GERD and indigestion.   Genitourinary: Negative for decreased libido, decreased urine volume, difficulty urinating, discharge, dysuria, flank pain, frequency, genital sores, hematuria, nocturia, penile pain, erectile dysfunction, penile swelling, scrotal swelling, testicular pain, urgency and urinary incontinence.   Musculoskeletal: Negative for back pain and joint swelling.   Neurological: Negative for tremors, seizures, speech difficulty, weakness and numbness.   Psychiatric/Behavioral: Negative for agitation, decreased concentration, sleep disturbance, depressed mood and stress. The patient is not nervous/anxious.        Physical Exam  Physical Exam  Vitals signs reviewed.   Constitutional:       Appearance: He is well-developed.   HENT:      Head: Normocephalic and atraumatic.   Neck:      Musculoskeletal: Normal range of motion.   Pulmonary:      Effort: Pulmonary effort is normal. No respiratory distress.   Abdominal:      General: There is no distension.      Palpations: Abdomen is soft. There is no mass.      Tenderness: There is no abdominal tenderness.      Hernia: No hernia is present.   Genitourinary:     Prostate: Normal.      Rectum: Normal.   Musculoskeletal: Normal range of motion.   Lymphadenopathy:      Cervical: No cervical adenopathy.   Skin:     General: Skin is warm and dry.   Neurological:      Mental Status: He is alert and oriented to person, place, and time.   Psychiatric:         Behavior: Behavior normal.         Labs Recent and today in the office:  Results for orders placed or  performed in visit on 10/19/20   Testosterone    Specimen: Blood   Result Value Ref Range    Testosterone, Total 169 (L) 264 - 916 ng/dL   Estradiol    Specimen: Blood   Result Value Ref Range    Estradiol <5.0 (L) 7.6 - 42.6 pg/mL   CBC & Differential    Specimen: Blood   Result Value Ref Range    WBC 5.49 3.40 - 10.80 10*3/mm3    RBC 4.69 4.14 - 5.80 10*6/mm3    Hemoglobin 12.5 (L) 13.0 - 17.7 g/dL    Hematocrit 37.7 37.5 - 51.0 %    MCV 80.4 79.0 - 97.0 fL    MCH 26.7 26.6 - 33.0 pg    MCHC 33.2 31.5 - 35.7 g/dL    RDW 13.5 12.3 - 15.4 %    Platelets 212 140 - 450 10*3/mm3    Neutrophil Rel % 51.6 42.7 - 76.0 %    Lymphocyte Rel % 31.5 19.6 - 45.3 %    Monocyte Rel % 10.7 5.0 - 12.0 %    Eosinophil Rel % 4.7 0.3 - 6.2 %    Basophil Rel % 1.1 0.0 - 1.5 %    Neutrophils Absolute 2.83 1.70 - 7.00 10*3/mm3    Lymphocytes Absolute 1.73 0.70 - 3.10 10*3/mm3    Monocytes Absolute 0.59 0.10 - 0.90 10*3/mm3    Eosinophils Absolute 0.26 0.00 - 0.40 10*3/mm3    Basophils Absolute 0.06 0.00 - 0.20 10*3/mm3    Immature Granulocyte Rel % 0.4 0.0 - 0.5 %    Immature Grans Absolute 0.02 0.00 - 0.05 10*3/mm3    nRBC 0.0 0.0 - 0.2 /100 WBC         Assessment & Plan  Hypergonadism/polycythemia: Patient continues to derive an excellent clinical benefit from supplement using the 17% compounded topical gel.  Digital rectal exam today is perfectly benign and recent PSA looks good at 0.709.  We will continue the current formula and he will return in 3 months for follow-up.  If he has a decline in his physical status we could increase the concentration of the gel to 20%.

## 2021-01-03 DIAGNOSIS — N52.9 ERECTILE DYSFUNCTION, UNSPECIFIED ERECTILE DYSFUNCTION TYPE: ICD-10-CM

## 2021-01-04 RX ORDER — SILDENAFIL CITRATE 20 MG/1
TABLET ORAL
Qty: 30 TABLET | Refills: 4 | Status: SHIPPED | OUTPATIENT
Start: 2021-01-04 | End: 2022-04-12 | Stop reason: SDUPTHER

## 2021-02-22 ENCOUNTER — OFFICE VISIT (OUTPATIENT)
Dept: UROLOGY | Facility: CLINIC | Age: 51
End: 2021-02-22

## 2021-02-22 VITALS
WEIGHT: 219 LBS | RESPIRATION RATE: 20 BRPM | DIASTOLIC BLOOD PRESSURE: 80 MMHG | TEMPERATURE: 97.6 F | OXYGEN SATURATION: 99 % | BODY MASS INDEX: 29.66 KG/M2 | HEIGHT: 72 IN | HEART RATE: 82 BPM | SYSTOLIC BLOOD PRESSURE: 150 MMHG

## 2021-02-22 DIAGNOSIS — E29.1 HYPOGONADISM IN MALE: Primary | ICD-10-CM

## 2021-02-22 LAB
BILIRUB BLD-MCNC: NEGATIVE MG/DL
CLARITY, POC: CLEAR
COLOR UR: YELLOW
GLUCOSE UR STRIP-MCNC: NEGATIVE MG/DL
KETONES UR QL: NEGATIVE
LEUKOCYTE EST, POC: NEGATIVE
NITRITE UR-MCNC: NEGATIVE MG/ML
PH UR: 7 [PH] (ref 5–8)
PROT UR STRIP-MCNC: NEGATIVE MG/DL
RBC # UR STRIP: NEGATIVE /UL
SP GR UR: 1.01 (ref 1–1.03)
UROBILINOGEN UR QL: NORMAL

## 2021-02-22 PROCEDURE — 81003 URINALYSIS AUTO W/O SCOPE: CPT | Performed by: UROLOGY

## 2021-02-22 PROCEDURE — 99214 OFFICE O/P EST MOD 30 MIN: CPT | Performed by: UROLOGY

## 2021-02-22 RX ORDER — DICLOFENAC SODIUM 75 MG/1
TABLET, DELAYED RELEASE ORAL
COMMUNITY
Start: 2020-12-15

## 2021-02-22 NOTE — PROGRESS NOTES
Chief Complaint  Hypogonadism (pt is here for 3 mon f/u )        NIA Obregon is a 50 y.o. male who returns today for follow-up of his history of hypogonadism having received a terrific benefit from supplement using the topical compounded gel at 17%.  His symptoms are difficult to assess recently since he has had a severe bout with COVID-19.  His blood work indicates it is safe to continue the supplement but he could probably increase the concentration to 20%.    Vitals:    02/22/21 1517   Resp: 20       Past Medical History  Past Medical History:   Diagnosis Date   • Hypogonadism in male        Past Surgical History  History reviewed. No pertinent surgical history.    Medications  has a current medication list which includes the following prescription(s): amlodipine, aspirin, vitamin d3, coenzyme q10, diclofenac, hydrochlorothiazide, lisinopril, omeprazole, multivitamin with fluoride/iron, pravastatin, rosuvastatin, sildenafil, and testosterone cypionate.      Allergies  Allergies   Allergen Reactions   • Penicillins Rash       Social History  Social History     Socioeconomic History   • Marital status:      Spouse name: Not on file   • Number of children: Not on file   • Years of education: Not on file   • Highest education level: Not on file   Occupational History     Employer: [employer]   Tobacco Use   • Smoking status: Never Smoker   • Smokeless tobacco: Never Used   Substance and Sexual Activity   • Alcohol use: No   • Drug use: No   • Sexual activity: Defer       Family History  He has no family history of bladder or kidney cancer  He has no family history of kidney stones      AUA Symptom Score:      Review of Systems  Review of Systems   Constitutional: Negative for activity change, appetite change, chills, fatigue, fever, unexpected weight gain and unexpected weight loss.   Respiratory: Negative for apnea, cough, chest tightness, shortness of breath, wheezing and stridor.     Cardiovascular: Negative for chest pain, palpitations and leg swelling.   Gastrointestinal: Negative for abdominal distention, abdominal pain, anal bleeding, blood in stool, constipation, diarrhea, nausea, rectal pain, vomiting, GERD and indigestion.   Genitourinary: Negative for decreased libido, decreased urine volume, difficulty urinating, discharge, dysuria, flank pain, frequency, genital sores, hematuria, nocturia, penile pain, erectile dysfunction, penile swelling, scrotal swelling, testicular pain, urgency and urinary incontinence.   Musculoskeletal: Negative for back pain and joint swelling.   Neurological: Negative for tremors, seizures, speech difficulty, weakness and numbness.   Psychiatric/Behavioral: Negative for agitation, decreased concentration, sleep disturbance, depressed mood and stress. The patient is not nervous/anxious.        Physical Exam  Physical Exam  Vitals signs reviewed.   Constitutional:       Appearance: He is well-developed.   HENT:      Head: Normocephalic and atraumatic.   Neck:      Musculoskeletal: Normal range of motion.   Pulmonary:      Effort: Pulmonary effort is normal. No respiratory distress.   Abdominal:      General: There is no distension.      Palpations: Abdomen is soft. There is no mass.      Tenderness: There is no abdominal tenderness.      Hernia: No hernia is present.   Musculoskeletal: Normal range of motion.   Lymphadenopathy:      Cervical: No cervical adenopathy.   Skin:     General: Skin is warm and dry.   Neurological:      Mental Status: He is alert and oriented to person, place, and time.   Psychiatric:         Behavior: Behavior normal.         Labs Recent and today in the office:  Results for orders placed or performed in visit on 02/18/21   Testosterone    Specimen: Blood   Result Value Ref Range    Testosterone, Total 401 264 - 916 ng/dL   Estradiol    Specimen: Blood   Result Value Ref Range    Estradiol 8.5 7.6 - 42.6 pg/mL   CBC & Differential     Specimen: Blood   Result Value Ref Range    WBC 6.19 3.40 - 10.80 10*3/mm3    RBC 4.89 4.14 - 5.80 10*6/mm3    Hemoglobin 13.1 13.0 - 17.7 g/dL    Hematocrit 39.4 37.5 - 51.0 %    MCV 80.6 79.0 - 97.0 fL    MCH 26.8 26.6 - 33.0 pg    MCHC 33.2 31.5 - 35.7 g/dL    RDW 15.3 12.3 - 15.4 %    Platelets 198 140 - 450 10*3/mm3    Neutrophil Rel % 45.1 42.7 - 76.0 %    Lymphocyte Rel % 39.7 19.6 - 45.3 %    Monocyte Rel % 9.9 5.0 - 12.0 %    Eosinophil Rel % 4.2 0.3 - 6.2 %    Basophil Rel % 0.8 0.0 - 1.5 %    Neutrophils Absolute 2.79 1.70 - 7.00 10*3/mm3    Lymphocytes Absolute 2.46 0.70 - 3.10 10*3/mm3    Monocytes Absolute 0.61 0.10 - 0.90 10*3/mm3    Eosinophils Absolute 0.26 0.00 - 0.40 10*3/mm3    Basophils Absolute 0.05 0.00 - 0.20 10*3/mm3    Immature Granulocyte Rel % 0.3 0.0 - 0.5 %    Immature Grans Absolute 0.02 0.00 - 0.05 10*3/mm3    nRBC 0.0 0.0 - 0.2 /100 WBC         Assessment & Plan  Hypogonadism: Patient recently ordered a 3-month supply of the 17% but I suggested increasing this to 20% for the next prescription.  He will need to return in 3 months for follow-up to monitor for toxicity of therapy.

## 2021-04-23 ENCOUNTER — LAB (OUTPATIENT)
Dept: UROLOGY | Facility: CLINIC | Age: 51
End: 2021-04-23

## 2021-04-23 DIAGNOSIS — E29.1 HYPOGONADISM IN MALE: Primary | ICD-10-CM

## 2021-04-24 LAB
BASOPHILS # BLD AUTO: 0.08 10*3/MM3 (ref 0–0.2)
BASOPHILS NFR BLD AUTO: 1.3 % (ref 0–1.5)
EOSINOPHIL # BLD AUTO: 0.27 10*3/MM3 (ref 0–0.4)
EOSINOPHIL NFR BLD AUTO: 4.5 % (ref 0.3–6.2)
ERYTHROCYTE [DISTWIDTH] IN BLOOD BY AUTOMATED COUNT: 14.6 % (ref 12.3–15.4)
ESTRADIOL SERPL-MCNC: 11.9 PG/ML (ref 7.6–42.6)
HCT VFR BLD AUTO: 43.4 % (ref 37.5–51)
HGB BLD-MCNC: 14.1 G/DL (ref 13–17.7)
IMM GRANULOCYTES # BLD AUTO: 0.02 10*3/MM3 (ref 0–0.05)
IMM GRANULOCYTES NFR BLD AUTO: 0.3 % (ref 0–0.5)
LYMPHOCYTES # BLD AUTO: 2.5 10*3/MM3 (ref 0.7–3.1)
LYMPHOCYTES NFR BLD AUTO: 41.9 % (ref 19.6–45.3)
MCH RBC QN AUTO: 26.8 PG (ref 26.6–33)
MCHC RBC AUTO-ENTMCNC: 32.5 G/DL (ref 31.5–35.7)
MCV RBC AUTO: 82.5 FL (ref 79–97)
MONOCYTES # BLD AUTO: 0.61 10*3/MM3 (ref 0.1–0.9)
MONOCYTES NFR BLD AUTO: 10.2 % (ref 5–12)
NEUTROPHILS # BLD AUTO: 2.48 10*3/MM3 (ref 1.7–7)
NEUTROPHILS NFR BLD AUTO: 41.8 % (ref 42.7–76)
NRBC BLD AUTO-RTO: 0 /100 WBC (ref 0–0.2)
PLATELET # BLD AUTO: 203 10*3/MM3 (ref 140–450)
PSA SERPL-MCNC: 0.64 NG/ML (ref 0–4)
RBC # BLD AUTO: 5.26 10*6/MM3 (ref 4.14–5.8)
TESTOST SERPL-MCNC: 458 NG/DL (ref 264–916)
WBC # BLD AUTO: 5.96 10*3/MM3 (ref 3.4–10.8)

## 2021-04-26 ENCOUNTER — OFFICE VISIT (OUTPATIENT)
Dept: UROLOGY | Facility: CLINIC | Age: 51
End: 2021-04-26

## 2021-04-26 VITALS
HEIGHT: 72 IN | TEMPERATURE: 98 F | HEART RATE: 64 BPM | SYSTOLIC BLOOD PRESSURE: 130 MMHG | WEIGHT: 219 LBS | OXYGEN SATURATION: 98 % | DIASTOLIC BLOOD PRESSURE: 82 MMHG | BODY MASS INDEX: 29.66 KG/M2

## 2021-04-26 DIAGNOSIS — E29.1 HYPOGONADISM IN MALE: Primary | ICD-10-CM

## 2021-04-26 DIAGNOSIS — D75.1 POLYCYTHEMIA: ICD-10-CM

## 2021-04-26 DIAGNOSIS — N52.9 ERECTILE DYSFUNCTION, UNSPECIFIED ERECTILE DYSFUNCTION TYPE: ICD-10-CM

## 2021-04-26 PROCEDURE — 99214 OFFICE O/P EST MOD 30 MIN: CPT | Performed by: UROLOGY

## 2021-04-26 NOTE — PROGRESS NOTES
Chief Complaint  Hypogonadism        HPI  Mindi is a 50 y.o. male who returns today for follow-up of hypogonadism enjoying a great benefit from supplement currently still using a 17% testosterone cream compounded by the [facility].  He understands the need to monitor for toxicity of therapy and came by recently with blood work.  Fortunately all parameters are nominal.  He does periodically donate blood to avoid polycythemia and his hemoglobin is currently 14.  His testosterone was 458 but he is requesting increase to the 20% compounded cream and this is provided.    Vitals:    04/26/21 1405   BP: 130/82   Pulse: 64   Temp: 98 °F (36.7 °C)   SpO2: 98%       Past Medical History  Past Medical History:   Diagnosis Date   • Hypogonadism in male        Past Surgical History  No past surgical history on file.    Medications  has a current medication list which includes the following prescription(s): amlodipine, aspirin, vitamin d3, coenzyme q10, diclofenac, hydrochlorothiazide, lisinopril, omeprazole, multivitamin with fluoride/iron, pravastatin, rosuvastatin, sildenafil, testosterone, and testosterone cypionate.      Allergies  Allergies   Allergen Reactions   • Penicillins Rash       Social History  Social History     Socioeconomic History   • Marital status:      Spouse name: Not on file   • Number of children: Not on file   • Years of education: Not on file   • Highest education level: Not on file   Tobacco Use   • Smoking status: Never Smoker   • Smokeless tobacco: Never Used   Vaping Use   • Vaping Use: Never used   Substance and Sexual Activity   • Alcohol use: No   • Drug use: No   • Sexual activity: Defer       Family History  He has no family history of bladder or kidney cancer  He has no family history of kidney stones      AUA Symptom Score:      Review of Systems  Review of Systems   Constitutional: Negative for activity change, appetite change, chills, fatigue, fever, unexpected weight gain  and unexpected weight loss.   Respiratory: Negative for apnea, cough, chest tightness, shortness of breath, wheezing and stridor.    Cardiovascular: Negative for chest pain, palpitations and leg swelling.   Gastrointestinal: Negative for abdominal distention, abdominal pain, anal bleeding, blood in stool, constipation, diarrhea, nausea, rectal pain, vomiting, GERD and indigestion.   Genitourinary: Negative for decreased libido, decreased urine volume, difficulty urinating, discharge, dysuria, flank pain, frequency, genital sores, hematuria, nocturia, penile pain, erectile dysfunction, penile swelling, scrotal swelling, testicular pain, urgency and urinary incontinence.   Musculoskeletal: Negative for back pain and joint swelling.   Neurological: Negative for tremors, seizures, speech difficulty, weakness and numbness.   Psychiatric/Behavioral: Negative for agitation, decreased concentration, sleep disturbance, depressed mood and stress. The patient is not nervous/anxious.        Physical Exam  Physical Exam  Vitals reviewed.   Constitutional:       Appearance: He is well-developed.   HENT:      Head: Normocephalic and atraumatic.   Pulmonary:      Effort: Pulmonary effort is normal. No respiratory distress.   Abdominal:      General: There is no distension.      Palpations: Abdomen is soft. There is no mass.      Tenderness: There is no abdominal tenderness.      Hernia: No hernia is present.   Musculoskeletal:         General: Normal range of motion.      Cervical back: Normal range of motion.   Lymphadenopathy:      Cervical: No cervical adenopathy.   Skin:     General: Skin is warm and dry.   Neurological:      Mental Status: He is alert and oriented to person, place, and time.   Psychiatric:         Behavior: Behavior normal.         Labs Recent and today in the office:  Results for orders placed or performed in visit on 04/23/21   Testosterone    Specimen: Blood   Result Value Ref Range    Testosterone, Total  458 264 - 916 ng/dL   Estradiol    Specimen: Blood   Result Value Ref Range    Estradiol 11.9 7.6 - 42.6 pg/mL   PSA DIAGNOSTIC    Specimen: Blood   Result Value Ref Range    PSA 0.638 0.000 - 4.000 ng/mL   CBC & Differential    Specimen: Blood   Result Value Ref Range    WBC 5.96 3.40 - 10.80 10*3/mm3    RBC 5.26 4.14 - 5.80 10*6/mm3    Hemoglobin 14.1 13.0 - 17.7 g/dL    Hematocrit 43.4 37.5 - 51.0 %    MCV 82.5 79.0 - 97.0 fL    MCH 26.8 26.6 - 33.0 pg    MCHC 32.5 31.5 - 35.7 g/dL    RDW 14.6 12.3 - 15.4 %    Platelets 203 140 - 450 10*3/mm3    Neutrophil Rel % 41.8 (L) 42.7 - 76.0 %    Lymphocyte Rel % 41.9 19.6 - 45.3 %    Monocyte Rel % 10.2 5.0 - 12.0 %    Eosinophil Rel % 4.5 0.3 - 6.2 %    Basophil Rel % 1.3 0.0 - 1.5 %    Neutrophils Absolute 2.48 1.70 - 7.00 10*3/mm3    Lymphocytes Absolute 2.50 0.70 - 3.10 10*3/mm3    Monocytes Absolute 0.61 0.10 - 0.90 10*3/mm3    Eosinophils Absolute 0.27 0.00 - 0.40 10*3/mm3    Basophils Absolute 0.08 0.00 - 0.20 10*3/mm3    Immature Granulocyte Rel % 0.3 0.0 - 0.5 %    Immature Grans Absolute 0.02 0.00 - 0.05 10*3/mm3    nRBC 0.0 0.0 - 0.2 /100 WBC         Assessment & Plan  Hypogonadism: He is enjoying an excellent benefit from supplement and anxious to continue.  He is working out a lot with this newfound energy and currently is only complaint is plantar fasciitis from overdoing the workout.  He understands the need to continue close monitoring but in my intermediate he will return in 6 months for follow-up.

## 2021-10-04 ENCOUNTER — LAB (OUTPATIENT)
Dept: UROLOGY | Facility: CLINIC | Age: 51
End: 2021-10-04

## 2021-10-04 DIAGNOSIS — E29.1 HYPOGONADISM IN MALE: Primary | ICD-10-CM

## 2021-10-04 DIAGNOSIS — Z12.5 SCREENING PSA (PROSTATE SPECIFIC ANTIGEN): ICD-10-CM

## 2021-10-05 LAB
ERYTHROCYTE [DISTWIDTH] IN BLOOD BY AUTOMATED COUNT: 15.3 % (ref 12.3–15.4)
ESTRADIOL SERPL-MCNC: 16.2 PG/ML (ref 7.6–42.6)
HCT VFR BLD AUTO: 43.9 % (ref 37.5–51)
HGB BLD-MCNC: 14 G/DL (ref 13–17.7)
MCH RBC QN AUTO: 25.8 PG (ref 26.6–33)
MCHC RBC AUTO-ENTMCNC: 31.9 G/DL (ref 31.5–35.7)
MCV RBC AUTO: 80.8 FL (ref 79–97)
PLATELET # BLD AUTO: 208 10*3/MM3 (ref 140–450)
PSA SERPL-MCNC: 0.68 NG/ML (ref 0–4)
RBC # BLD AUTO: 5.43 10*6/MM3 (ref 4.14–5.8)
TESTOST SERPL-MCNC: 328 NG/DL (ref 264–916)
WBC # BLD AUTO: 6.27 10*3/MM3 (ref 3.4–10.8)

## 2021-10-06 ENCOUNTER — OFFICE VISIT (OUTPATIENT)
Dept: UROLOGY | Facility: CLINIC | Age: 51
End: 2021-10-06

## 2021-10-06 VITALS
OXYGEN SATURATION: 97 % | BODY MASS INDEX: 29.65 KG/M2 | HEART RATE: 77 BPM | HEIGHT: 72 IN | TEMPERATURE: 97.5 F | DIASTOLIC BLOOD PRESSURE: 98 MMHG | SYSTOLIC BLOOD PRESSURE: 168 MMHG | WEIGHT: 218.92 LBS

## 2021-10-06 DIAGNOSIS — E29.1 HYPOGONADISM IN MALE: Primary | ICD-10-CM

## 2021-10-06 PROCEDURE — 81003 URINALYSIS AUTO W/O SCOPE: CPT | Performed by: UROLOGY

## 2021-10-06 PROCEDURE — 99214 OFFICE O/P EST MOD 30 MIN: CPT | Performed by: UROLOGY

## 2021-10-06 NOTE — PROGRESS NOTES
Chief Complaint  Follow-up (5 month follow up hypogonadism)        NIA Obregon is a 50 y.o. male who returns today for follow-up of hypogonadism being treated with supplement using topical compounded 20% testosterone cream.  He is achieved a significant improvement in his strength stamina energy and libido and is anxious to continue.  He understands the need to monitor for toxicity therapy and came by recently for blood work.  Fortunately all parameters are nominal.    Vitals:    10/06/21 1415   BP: 168/98   Pulse: 77   Temp: 97.5 °F (36.4 °C)   SpO2: 97%       Past Medical History  Past Medical History:   Diagnosis Date   • Hypogonadism in male        Past Surgical History  History reviewed. No pertinent surgical history.    Medications  has a current medication list which includes the following prescription(s): amlodipine, vitamin d3, coenzyme q10, diclofenac, lisinopril, multivitamin with fluoride/iron, pravastatin, rosuvastatin, sildenafil, testosterone, aspirin, hydrochlorothiazide, omeprazole, and testosterone cypionate.      Allergies  Allergies   Allergen Reactions   • Penicillins Rash       Social History  Social History     Socioeconomic History   • Marital status:      Spouse name: Not on file   • Number of children: Not on file   • Years of education: Not on file   • Highest education level: Not on file   Tobacco Use   • Smoking status: Never Smoker   • Smokeless tobacco: Never Used   Vaping Use   • Vaping Use: Never used   Substance and Sexual Activity   • Alcohol use: No   • Drug use: No   • Sexual activity: Defer       Family History  He has no family history of bladder or kidney cancer  He has no family history of kidney stones      AUA Symptom Score:      Review of Systems  Review of Systems   Constitutional: Negative for activity change, appetite change, chills, fatigue, fever, unexpected weight gain and unexpected weight loss.   Respiratory: Negative for apnea, cough, chest tightness,  shortness of breath, wheezing and stridor.    Cardiovascular: Negative for chest pain, palpitations and leg swelling.   Gastrointestinal: Negative for abdominal distention, abdominal pain, anal bleeding, blood in stool, constipation, diarrhea, nausea, rectal pain, vomiting, GERD and indigestion.   Genitourinary: Negative for decreased libido, decreased urine volume, difficulty urinating, discharge, dysuria, flank pain, frequency, genital sores, hematuria, nocturia, penile pain, erectile dysfunction, penile swelling, scrotal swelling, testicular pain, urgency and urinary incontinence.   Musculoskeletal: Negative for back pain and joint swelling.   Neurological: Negative for tremors, seizures, speech difficulty, weakness and numbness.   Psychiatric/Behavioral: Negative for agitation, decreased concentration, sleep disturbance, depressed mood and stress. The patient is not nervous/anxious.        Physical Exam  Physical Exam  Vitals reviewed.   Constitutional:       Appearance: He is well-developed.   HENT:      Head: Normocephalic and atraumatic.   Pulmonary:      Effort: Pulmonary effort is normal. No respiratory distress.   Abdominal:      General: There is no distension.      Palpations: Abdomen is soft. There is no mass.      Tenderness: There is no abdominal tenderness.      Hernia: No hernia is present.   Genitourinary:     Prostate: Normal.      Rectum: Normal.   Musculoskeletal:         General: Normal range of motion.      Cervical back: Normal range of motion.   Lymphadenopathy:      Cervical: No cervical adenopathy.   Skin:     General: Skin is warm and dry.   Neurological:      Mental Status: He is alert and oriented to person, place, and time.   Psychiatric:         Behavior: Behavior normal.         Labs Recent and today in the office:  Results for orders placed or performed in visit on 10/04/21   CBC (No Diff)    Specimen: Blood   Result Value Ref Range    WBC 6.27 3.40 - 10.80 10*3/mm3    RBC 5.43 4.14  - 5.80 10*6/mm3    Hemoglobin 14.0 13.0 - 17.7 g/dL    Hematocrit 43.9 37.5 - 51.0 %    MCV 80.8 79.0 - 97.0 fL    MCH 25.8 (L) 26.6 - 33.0 pg    MCHC 31.9 31.5 - 35.7 g/dL    RDW 15.3 12.3 - 15.4 %    Platelets 208 140 - 450 10*3/mm3   Testosterone    Specimen: Blood   Result Value Ref Range    Testosterone, Total 328 264 - 916 ng/dL   Estradiol    Specimen: Blood   Result Value Ref Range    Estradiol 16.2 7.6 - 42.6 pg/mL   PSA DIAGNOSTIC    Specimen: Blood   Result Value Ref Range    PSA 0.675 0.000 - 4.000 ng/mL         Assessment & Plan  Hypogonadism: He has had an excellent benefit from supplement and his blood work suggests it is safe to continue.  Digital rectal exam today is benign and PSA is normal.  Medication is renewed and he will return in 6 months for follow-up.

## 2022-04-12 ENCOUNTER — OFFICE VISIT (OUTPATIENT)
Dept: UROLOGY | Facility: CLINIC | Age: 52
End: 2022-04-12

## 2022-04-12 VITALS
DIASTOLIC BLOOD PRESSURE: 84 MMHG | HEART RATE: 86 BPM | WEIGHT: 218 LBS | HEIGHT: 72 IN | BODY MASS INDEX: 29.53 KG/M2 | SYSTOLIC BLOOD PRESSURE: 128 MMHG | OXYGEN SATURATION: 98 % | TEMPERATURE: 97.6 F

## 2022-04-12 DIAGNOSIS — E29.1 HYPOGONADISM IN MALE: Primary | ICD-10-CM

## 2022-04-12 DIAGNOSIS — N52.9 ERECTILE DYSFUNCTION, UNSPECIFIED ERECTILE DYSFUNCTION TYPE: ICD-10-CM

## 2022-04-12 PROCEDURE — 99214 OFFICE O/P EST MOD 30 MIN: CPT | Performed by: PHYSICIAN ASSISTANT

## 2022-04-12 RX ORDER — SILDENAFIL CITRATE 20 MG/1
TABLET ORAL
Qty: 90 TABLET | Refills: 4 | Status: SHIPPED | OUTPATIENT
Start: 2022-04-12 | End: 2022-10-28 | Stop reason: SDUPTHER

## 2022-04-12 NOTE — PROGRESS NOTES
Chief Complaint   Patient presents with   • Follow-up     6 month follow up/ med refill.        HPI  Mr. Obregon is a 51 y.o. male with history of hypogonadism who presents for follow up.     At this visit, he feels his symptoms have been well managed on topical testosterone that is custom compounded out of [place].  He has no concerns.  His ED has been well managed with sildenafil and he requests a refill of this.    Past Medical History:   Diagnosis Date   • Hypogonadism in male        History reviewed. No pertinent surgical history.      Current Outpatient Medications:   •  amLODIPine (NORVASC) 5 MG tablet, , Disp: , Rfl:   •  Cholecalciferol (VITAMIN D3) 5000 UNITS capsule capsule, Take 5,000 Units by mouth daily, Disp: , Rfl:   •  lisinopril (PRINIVIL,ZESTRIL) 30 MG tablet, , Disp: , Rfl:   •  omeprazole (PriLOSEC) 20 MG capsule, Take 20 mg by mouth daily, Disp: , Rfl:   •  Ped Multivitamins-Fl-Iron (MULTIVITAMIN WITH FLUORIDE/IRON) 0.25-10 MG/ML solution solution, Take by mouth daily, Disp: , Rfl:   •  rosuvastatin (CRESTOR) 10 MG tablet, Take 20 mg by mouth Daily., Disp: , Rfl:   •  sildenafil (REVATIO) 20 MG tablet, TAKE ONE TO THREE TABLETS BY MOUTH EVERY DAY AS NEEDED FOR ERECTILE DYSFUNCTION, Disp: 30 tablet, Rfl: 4  •  Testosterone 20 % cream, 4 Act Daily. Apply 4 clicks daily, Disp: 270 g, Rfl: 1  •  aspirin 325 MG tablet, Take 325 mg by mouth daily, Disp: , Rfl:   •  coenzyme Q10 100 MG capsule, Take 100 mg by mouth daily, Disp: , Rfl:   •  diclofenac (VOLTAREN) 75 MG EC tablet, , Disp: , Rfl:   •  hydrochlorothiazide (MICROZIDE) 12.5 MG capsule, Take 12.5 mg by mouth daily, Disp: , Rfl:   •  pravastatin (PRAVACHOL) 40 MG tablet, , Disp: , Rfl:   •  testosterone cypionate (DEPO-TESTOSTERONE) 100 MG/ML solution injection, Inject into the shoulder, thigh, or buttocks every 14 (fourteen) days, Disp: , Rfl:      Physical Exam  Visit Vitals  /84 (BP Location: Left arm, Patient Position: Sitting,  "Cuff Size: Adult)   Pulse 86   Temp 97.6 °F (36.4 °C) (Temporal)   Ht 182.9 cm (72.01\")   Wt 98.9 kg (218 lb)   SpO2 98%   BMI 29.56 kg/m²       Labs  Brief Urine Lab Results  (Last result in the past 365 days)      Color   Clarity   Blood   Leuk Est   Nitrite   Protein   CREAT   Urine HCG        10/06/21 1422 Yellow   Clear   Negative   Negative   Negative   Negative                 Lab Results   Component Value Date    GLUCOSE 92 06/16/2020    CALCIUM 9.3 06/16/2020     06/16/2020    K 4.7 06/16/2020    CO2 25.7 06/16/2020     06/16/2020    BUN 11 06/16/2020    CREATININE 1.15 06/16/2020    EGFRIFNONA 68 06/16/2020    BCR 9.6 06/16/2020    ANIONGAP 11.3 06/16/2020       Lab Results   Component Value Date    WBC 6.27 10/04/2021    HGB 14.0 10/04/2021    HCT 43.9 10/04/2021    MCV 80.8 10/04/2021     10/04/2021            Lab Results   Component Value Date    PSA 0.675 10/04/2021    PSA 0.638 04/23/2021    PSA 0.709 06/16/2020       Lab Results   Component Value Date    TESTOSTERONE 243.75 09/23/2016            Assessment  51 y.o. male with history of hypogonadism, maintained long-term on topical testosterone 20%, 4 pumps per day.  His labs are appropriate with hematocrit of only 43 and testosterone 545.    Plan  1.  Continue topical testosterone custom compounded medication  2.  Continue sildenafil 20 mg as needed    Follow-up in 6 months with labs prior  "

## 2022-04-13 ENCOUNTER — LAB (OUTPATIENT)
Dept: LAB | Facility: HOSPITAL | Age: 52
End: 2022-04-13

## 2022-04-13 PROCEDURE — 84403 ASSAY OF TOTAL TESTOSTERONE: CPT | Performed by: UROLOGY

## 2022-04-13 PROCEDURE — 85027 COMPLETE CBC AUTOMATED: CPT | Performed by: UROLOGY

## 2022-04-13 PROCEDURE — 82670 ASSAY OF TOTAL ESTRADIOL: CPT | Performed by: UROLOGY

## 2022-05-18 ENCOUNTER — TELEPHONE (OUTPATIENT)
Dept: UROLOGY | Facility: CLINIC | Age: 52
End: 2022-05-18

## 2022-10-27 ENCOUNTER — LAB (OUTPATIENT)
Dept: UROLOGY | Facility: CLINIC | Age: 52
End: 2022-10-27

## 2022-10-28 ENCOUNTER — OFFICE VISIT (OUTPATIENT)
Dept: UROLOGY | Facility: CLINIC | Age: 52
End: 2022-10-28

## 2022-10-28 VITALS
HEIGHT: 72 IN | DIASTOLIC BLOOD PRESSURE: 84 MMHG | SYSTOLIC BLOOD PRESSURE: 142 MMHG | HEART RATE: 75 BPM | BODY MASS INDEX: 31.56 KG/M2 | WEIGHT: 233 LBS | OXYGEN SATURATION: 100 %

## 2022-10-28 DIAGNOSIS — E29.1 HYPOGONADISM IN MALE: Primary | ICD-10-CM

## 2022-10-28 DIAGNOSIS — Z12.5 SCREENING PSA (PROSTATE SPECIFIC ANTIGEN): ICD-10-CM

## 2022-10-28 DIAGNOSIS — N52.9 ERECTILE DYSFUNCTION, UNSPECIFIED ERECTILE DYSFUNCTION TYPE: ICD-10-CM

## 2022-10-28 PROCEDURE — 99214 OFFICE O/P EST MOD 30 MIN: CPT | Performed by: PHYSICIAN ASSISTANT

## 2022-10-28 PROCEDURE — 81002 URINALYSIS NONAUTO W/O SCOPE: CPT | Performed by: PHYSICIAN ASSISTANT

## 2022-10-28 RX ORDER — SILDENAFIL CITRATE 20 MG/1
TABLET ORAL
Qty: 90 TABLET | Refills: 4 | Status: SHIPPED | OUTPATIENT
Start: 2022-10-28

## 2022-10-28 NOTE — PROGRESS NOTES
"Chief Complaint   Patient presents with   • Follow-up     hypogonadism        HPI  Mr. Obregon is a 51 y.o. male with history of hypogonadism who presents for annual exam.     At this visit, denies any urinary symptoms.    Past Medical History:   Diagnosis Date   • Hypogonadism in male        History reviewed. No pertinent surgical history.      Current Outpatient Medications:   •  amLODIPine (NORVASC) 5 MG tablet, , Disp: , Rfl:   •  Cholecalciferol (VITAMIN D3) 5000 UNITS capsule capsule, Take 5,000 Units by mouth daily, Disp: , Rfl:   •  diclofenac (VOLTAREN) 75 MG EC tablet, , Disp: , Rfl:   •  lisinopril (PRINIVIL,ZESTRIL) 30 MG tablet, , Disp: , Rfl:   •  omeprazole (PriLOSEC) 20 MG capsule, Take 20 mg by mouth daily, Disp: , Rfl:   •  Ped Multivitamins-Fl-Iron (MULTIVITAMIN WITH FLUORIDE/IRON) 0.25-10 MG/ML solution solution, Take by mouth daily, Disp: , Rfl:   •  rosuvastatin (CRESTOR) 10 MG tablet, Take 20 mg by mouth Daily., Disp: , Rfl:   •  sildenafil (REVATIO) 20 MG tablet, TAKE ONE TO THREE TABLETS BY MOUTH EVERY DAY AS NEEDED FOR ERECTILE DYSFUNCTION, Disp: 90 tablet, Rfl: 4  •  Testosterone 20 % cream, 4 Act Daily. Apply 4 clicks daily, Disp: 270 g, Rfl: 1     Physical Exam  Visit Vitals  /84   Pulse 75   Ht 182.9 cm (72.01\")   Wt 106 kg (233 lb)   SpO2 100%   BMI 31.59 kg/m²       Labs  Brief Urine Lab Results  (Last result in the past 365 days)      Color   Clarity   Blood   Leuk Est   Nitrite   Protein   CREAT   Urine HCG        10/28/22 1642 Yellow   Clear   Negative   Negative   Negative   Negative                 Lab Results   Component Value Date    GLUCOSE 92 06/16/2020    CALCIUM 9.3 06/16/2020     06/16/2020    K 4.7 06/16/2020    CO2 25.7 06/16/2020     06/16/2020    BUN 11 06/16/2020    CREATININE 1.15 06/16/2020    EGFRIFNONA 68 06/16/2020    BCR 9.6 06/16/2020    ANIONGAP 11.3 06/16/2020       Lab Results   Component Value Date    WBC 5.57 04/13/2022    HGB 13.9 " 10/27/2022    HCT 41.0 10/27/2022    MCV 81.4 04/13/2022     04/13/2022            Lab Results   Component Value Date    PSA 0.675 10/04/2021    PSA 0.638 04/23/2021    PSA 0.709 06/16/2020       Lab Results   Component Value Date    TESTOSTERONE 545.00 04/13/2022          Assessment  51 y.o. male with history of hypogonadism on topical HRT, erectile dysfunction.  Today is additionally his annual exam and he is due for PSA.  His screening UA is benign.  His testosterone is on the lower limits of normal, and he believes he could benefit from dose adjustment, as do I.  Hematocrit is well within normal limits at 41.    He is currently taking testosterone topical gel at 200 mg/g, 20% solution.  He applies 200 mg daily.  After speaking with [facility] regarding dose adjustment, we will increase him by 1 click per day which increases his dose to 250 mg daily.  His new prescription will be a 25% solution, applying 1 g (4 clicks) daily.    Plan  1.  Obtain PSA today  2.  Continue topical testosterone, new prescription for 25% solution  3.  Obtain total T and hematocrit in 6 months  4.  Continue sildenafil as needed

## 2022-10-29 LAB — PSA SERPL-MCNC: 0.66 NG/ML (ref 0–4)

## 2022-12-20 ENCOUNTER — TELEPHONE (OUTPATIENT)
Dept: UROLOGY | Facility: CLINIC | Age: 52
End: 2022-12-20

## 2022-12-20 NOTE — TELEPHONE ENCOUNTER
PT STATED OUT OF OLD TESTOSTERONE. PLEASE GO AHEAD AND ORDER NEW STRENGTH FROM THE SAME COMPOUND PHARM[facility].

## 2023-04-26 ENCOUNTER — TELEPHONE (OUTPATIENT)
Dept: UROLOGY | Facility: CLINIC | Age: 53
End: 2023-04-26
Payer: COMMERCIAL

## 2023-04-26 NOTE — TELEPHONE ENCOUNTER
Left vm for patient to get testo labs today for his appointment on Friday 4/28. If hes unable to get labs today- patient will need to reschedule appt.     Ok for [facility] to advise.   
144
Preop Instructions given to patients daughter, Pt to be NPO for 10 hours before surgery. No chewing gum or hard candy once NPO. No jewellery or make up or body piercing allowed in the OR/yes

## 2023-04-27 ENCOUNTER — LAB (OUTPATIENT)
Dept: UROLOGY | Facility: CLINIC | Age: 53
End: 2023-04-27
Payer: COMMERCIAL

## 2023-04-28 ENCOUNTER — TELEPHONE (OUTPATIENT)
Dept: UROLOGY | Facility: CLINIC | Age: 53
End: 2023-04-28

## 2023-04-28 ENCOUNTER — OFFICE VISIT (OUTPATIENT)
Dept: UROLOGY | Facility: CLINIC | Age: 53
End: 2023-04-28
Payer: COMMERCIAL

## 2023-04-28 VITALS
DIASTOLIC BLOOD PRESSURE: 72 MMHG | HEART RATE: 80 BPM | TEMPERATURE: 97.2 F | BODY MASS INDEX: 31.56 KG/M2 | OXYGEN SATURATION: 98 % | SYSTOLIC BLOOD PRESSURE: 126 MMHG | HEIGHT: 72 IN | WEIGHT: 233 LBS

## 2023-04-28 DIAGNOSIS — E29.1 HYPOGONADISM MALE: Primary | ICD-10-CM

## 2023-04-28 NOTE — PROGRESS NOTES
"Chief Complaint   Patient presents with   • Follow-up   • Hypogonadism        HPI  Mr. Obregon is a 52 y.o. male with history of hypogonadism on HRT, ED who presents for follow up.     At this visit, gave blood almost 4 months ago to help prevent polycythemia.  He is feeling well on testosterone and denies any concerns.  Sildenafil continues to work well for ED.    Past Medical History:   Diagnosis Date   • Erectile dysfunction 08/2014   • Hypogonadism in male        History reviewed. No pertinent surgical history.      Current Outpatient Medications:   •  amLODIPine (NORVASC) 5 MG tablet, , Disp: , Rfl:   •  Cholecalciferol (VITAMIN D3) 5000 UNITS capsule capsule, Take 1 capsule by mouth Daily., Disp: , Rfl:   •  diclofenac (VOLTAREN) 75 MG EC tablet, PRN, Disp: , Rfl:   •  lisinopril (PRINIVIL,ZESTRIL) 30 MG tablet, , Disp: , Rfl:   •  omeprazole (PriLOSEC) 20 MG capsule, Take 1 capsule by mouth Daily., Disp: , Rfl:   •  Ped Multivitamins-Fl-Iron (MULTIVITAMIN WITH FLUORIDE/IRON) 0.25-10 MG/ML solution solution, Take by mouth daily, Disp: , Rfl:   •  rosuvastatin (CRESTOR) 10 MG tablet, Take 2 tablets by mouth Daily., Disp: , Rfl:   •  sildenafil (REVATIO) 20 MG tablet, TAKE ONE TO THREE TABLETS BY MOUTH EVERY DAY AS NEEDED FOR ERECTILE DYSFUNCTION, Disp: 90 tablet, Rfl: 4  •  Testosterone 20 % cream, 4 Act Daily. Apply 4 clicks daily, Disp: 270 g, Rfl: 1     Physical Exam  Visit Vitals  /72   Pulse 80   Temp 97.2 °F (36.2 °C)   Ht 182.9 cm (72\")   Wt 106 kg (233 lb)   SpO2 98%   BMI 31.60 kg/m²       Labs  Brief Urine Lab Results  (Last result in the past 365 days)      Color   Clarity   Blood   Leuk Est   Nitrite   Protein   CREAT   Urine HCG        10/28/22 1642 Yellow   Clear   Negative   Negative   Negative   Negative                 Lab Results   Component Value Date    GLUCOSE 92 06/16/2020    CALCIUM 9.3 06/16/2020     06/16/2020    K 4.7 06/16/2020    CO2 25.7 06/16/2020     06/16/2020    " BUN 11 06/16/2020    CREATININE 1.15 06/16/2020    EGFRIFNONA 68 06/16/2020    BCR 9.6 06/16/2020    ANIONGAP 11.3 06/16/2020       Lab Results   Component Value Date    WBC 5.57 04/13/2022    HGB 14.3 04/27/2023    HCT 42.7 04/27/2023    MCV 81.4 04/13/2022     04/13/2022            Lab Results   Component Value Date    PSA 0.663 10/28/2022    PSA 0.675 10/04/2021    PSA 0.638 04/23/2021       Lab Results   Component Value Date    TESTOSTERONE 545.00 04/13/2022          Assessment  52 y.o. male with history of hypogonadism on HRT and ED.    Total testosterone well within normal limits, hematocrit ideal.  Denies adverse effects.      Plan  1.  Continue topical testosterone from [facility] at 250 mg daily  2. FU in 6 months with labs prior  3.  Due for screening PSA at follow-up

## 2023-04-28 NOTE — TELEPHONE ENCOUNTER
Patient needs reschedule in [facility]'s next avaiable slot due to labs not being completed. Ok for [facility] to reschedule.    Peter, CMA

## 2023-11-02 ENCOUNTER — LAB (OUTPATIENT)
Dept: LAB | Facility: HOSPITAL | Age: 53
End: 2023-11-02
Payer: COMMERCIAL

## 2023-11-02 PROCEDURE — 82670 ASSAY OF TOTAL ESTRADIOL: CPT | Performed by: PHYSICIAN ASSISTANT

## 2023-11-02 PROCEDURE — 84153 ASSAY OF PSA TOTAL: CPT | Performed by: PHYSICIAN ASSISTANT

## 2023-11-02 PROCEDURE — 84403 ASSAY OF TOTAL TESTOSTERONE: CPT | Performed by: PHYSICIAN ASSISTANT

## 2023-11-02 PROCEDURE — 85018 HEMOGLOBIN: CPT | Performed by: PHYSICIAN ASSISTANT

## 2023-11-02 PROCEDURE — 85014 HEMATOCRIT: CPT | Performed by: PHYSICIAN ASSISTANT

## 2023-11-03 ENCOUNTER — OFFICE VISIT (OUTPATIENT)
Dept: UROLOGY | Facility: CLINIC | Age: 53
End: 2023-11-03
Payer: COMMERCIAL

## 2023-11-03 VITALS
TEMPERATURE: 98.5 F | HEART RATE: 91 BPM | HEIGHT: 72 IN | BODY MASS INDEX: 31.56 KG/M2 | DIASTOLIC BLOOD PRESSURE: 92 MMHG | OXYGEN SATURATION: 99 % | WEIGHT: 233 LBS | SYSTOLIC BLOOD PRESSURE: 142 MMHG

## 2023-11-03 DIAGNOSIS — N52.9 ERECTILE DYSFUNCTION, UNSPECIFIED ERECTILE DYSFUNCTION TYPE: ICD-10-CM

## 2023-11-03 RX ORDER — SILDENAFIL CITRATE 20 MG/1
TABLET ORAL
Qty: 90 TABLET | Refills: 6 | Status: SHIPPED | OUTPATIENT
Start: 2023-11-03

## 2023-11-03 NOTE — PROGRESS NOTES
"Chief Complaint   Patient presents with    Hypogonadism in male     6 month fu         HPI  Mr. Obregon is a 52 y.o. male with history of hypogonadism on HRT, ED who presents for follow up.     At this visit, continues to have phenomenal benefit from HRT.  He continues to work on diet and exercise for overall health benefit.    Past Medical History:   Diagnosis Date    Erectile dysfunction 08/2014    Hypogonadism in male        History reviewed. No pertinent surgical history.      Current Outpatient Medications:     amLODIPine (NORVASC) 5 MG tablet, , Disp: , Rfl:     Cholecalciferol (VITAMIN D3) 5000 UNITS capsule capsule, Take 1 capsule by mouth Daily., Disp: , Rfl:     diclofenac (VOLTAREN) 75 MG EC tablet, PRN, Disp: , Rfl:     lisinopril (PRINIVIL,ZESTRIL) 30 MG tablet, , Disp: , Rfl:     omeprazole (PriLOSEC) 20 MG capsule, Take 1 capsule by mouth Daily., Disp: , Rfl:     Ped Multivitamins-Fl-Iron (MULTIVITAMIN WITH FLUORIDE/IRON) 0.25-10 MG/ML solution solution, Take by mouth daily, Disp: , Rfl:     rosuvastatin (CRESTOR) 10 MG tablet, Take 2 tablets by mouth Daily., Disp: , Rfl:     sildenafil (REVATIO) 20 MG tablet, TAKE ONE TO THREE TABLETS BY MOUTH EVERY DAY AS NEEDED FOR ERECTILE DYSFUNCTION, Disp: 90 tablet, Rfl: 4    Testosterone 20 % cream, 4 Act Daily. Apply 4 clicks daily, Disp: 270 g, Rfl: 1     Physical Exam  Visit Vitals  /92 (BP Location: Left arm, Patient Position: Sitting, Cuff Size: Adult)   Pulse 91   Temp 98.5 °F (36.9 °C) (Temporal)   Ht 182.9 cm (72\")   Wt 106 kg (233 lb)   SpO2 99%   BMI 31.60 kg/m²       Labs  Brief Urine Lab Results       None            Lab Results   Component Value Date    GLUCOSE 92 06/16/2020    CALCIUM 9.3 06/16/2020     06/16/2020    K 4.7 06/16/2020    CO2 25.7 06/16/2020     06/16/2020    BUN 11 06/16/2020    CREATININE 1.15 06/16/2020    EGFRIFNONA 68 06/16/2020    BCR 9.6 06/16/2020    ANIONGAP 11.3 06/16/2020       Lab Results   Component " Value Date    WBC 5.57 04/13/2022    HGB 13.3 11/02/2023    HCT 41.4 11/02/2023    MCV 81.4 04/13/2022     04/13/2022            Lab Results   Component Value Date    PSA 0.963 11/02/2023    PSA 0.663 10/28/2022    PSA 0.675 10/04/2021       Lab Results   Component Value Date    TESTOSTERONE 923.00 (H) 11/02/2023          Assessment  52 y.o. male with history of hypogonadism on HRT and ED.    He has had an excellent serum response to topical testosterone with testosterone at the upper limits of normal.  He is tolerating this very well with an ideal estradiol, low normal hematocrit (tells me he just donated blood at [facility]).  Very low PSA.    No concerns regarding sildenafil.    Plan  1.  Continue topical testosterone ([facility])  2.  Follow-up in 6 months with total T, H&H prior  3.  Continue sildenafil as needed

## 2024-05-03 ENCOUNTER — TELEPHONE (OUTPATIENT)
Dept: UROLOGY | Facility: CLINIC | Age: 54
End: 2024-05-03
Payer: COMMERCIAL

## 2024-05-03 DIAGNOSIS — Z12.5 SCREENING PSA (PROSTATE SPECIFIC ANTIGEN): ICD-10-CM

## 2024-05-03 DIAGNOSIS — N52.9 ERECTILE DYSFUNCTION, UNSPECIFIED ERECTILE DYSFUNCTION TYPE: ICD-10-CM

## 2024-05-03 DIAGNOSIS — E29.1 HYPOGONADISM MALE: Primary | ICD-10-CM

## 2024-05-09 ENCOUNTER — LAB (OUTPATIENT)
Dept: LAB | Facility: HOSPITAL | Age: 54
End: 2024-05-09
Payer: COMMERCIAL

## 2024-05-09 DIAGNOSIS — E29.1 HYPOGONADISM MALE: ICD-10-CM

## 2024-05-09 LAB
HCT VFR BLD AUTO: 42.6 % (ref 37.5–51)
HGB BLD-MCNC: 13.6 G/DL (ref 13–17.7)

## 2024-05-09 PROCEDURE — 85014 HEMATOCRIT: CPT

## 2024-05-09 PROCEDURE — 36415 COLL VENOUS BLD VENIPUNCTURE: CPT

## 2024-05-09 PROCEDURE — 84402 ASSAY OF FREE TESTOSTERONE: CPT

## 2024-05-09 PROCEDURE — 85018 HEMOGLOBIN: CPT

## 2024-05-09 PROCEDURE — 84403 ASSAY OF TOTAL TESTOSTERONE: CPT

## 2024-05-10 ENCOUNTER — OFFICE VISIT (OUTPATIENT)
Dept: UROLOGY | Facility: CLINIC | Age: 54
End: 2024-05-10
Payer: COMMERCIAL

## 2024-05-10 VITALS
WEIGHT: 230 LBS | DIASTOLIC BLOOD PRESSURE: 102 MMHG | SYSTOLIC BLOOD PRESSURE: 142 MMHG | BODY MASS INDEX: 31.15 KG/M2 | HEIGHT: 72 IN

## 2024-05-10 DIAGNOSIS — N52.9 ERECTILE DYSFUNCTION, UNSPECIFIED ERECTILE DYSFUNCTION TYPE: ICD-10-CM

## 2024-05-10 DIAGNOSIS — E29.1 HYPOGONADISM MALE: Primary | ICD-10-CM

## 2024-05-10 RX ORDER — ALIROCUMAB 75 MG/ML
INJECTION, SOLUTION SUBCUTANEOUS
COMMUNITY
Start: 2024-01-02

## 2024-05-10 RX ORDER — SILDENAFIL CITRATE 20 MG/1
TABLET ORAL
Qty: 90 TABLET | Refills: 6 | Status: SHIPPED | OUTPATIENT
Start: 2024-05-10

## 2024-05-10 RX ORDER — LISINOPRIL 40 MG/1
TABLET ORAL
COMMUNITY
Start: 2024-03-10

## 2024-05-10 RX ORDER — EVOLOCUMAB 140 MG/ML
INJECTION, SOLUTION SUBCUTANEOUS
COMMUNITY
Start: 2023-12-26

## 2024-05-15 LAB
TESTOST FREE SERPL-MCNC: 28.4 PG/ML (ref 7.2–24)
TESTOST SERPL-MCNC: 1132 NG/DL (ref 264–916)

## 2024-08-07 ENCOUNTER — TELEPHONE (OUTPATIENT)
Dept: UROLOGY | Facility: CLINIC | Age: 54
End: 2024-08-07
Payer: COMMERCIAL

## 2024-08-07 NOTE — TELEPHONE ENCOUNTER
Caller: JOSE ALLEN    Relationship: SELF    Best call back number: [phone]    Requested Prescriptions: TESTOSTERONE  Requested Prescriptions      No prescriptions requested or ordered in this encounter        Pharmacy where request should be sent:      [place], [place] - [address] [place] [address] - [phone]  - [phone] FX  [address] [facility] [address]  Phone: [phone] Fax: [phone]        Last office visit with prescribing clinician: 5/10/2024   Last telemedicine visit with prescribing clinician: Visit date not found   Next office visit with prescribing clinician: 11/15/2024     Additional details provided by patient: PT IS OUT OF REFILLS. NEEDS MORE TESTOSTERONE.    Does the patient have less than a 3 day supply:  [x] Yes  [] No    Would you like a call back once the refill request has been completed: [x] Yes [] No    If the office needs to give you a call back, can they leave a voicemail: [x] Yes [] No

## 2024-08-08 ENCOUNTER — TELEPHONE (OUTPATIENT)
Dept: UROLOGY | Facility: CLINIC | Age: 54
End: 2024-08-08

## 2024-08-08 NOTE — TELEPHONE ENCOUNTER
Caller: Selvin Obregon    Relationship: Self    Best call back number: [phone]    What is the best time to reach you: ANY    Who are you requesting to speak with (clinical staff, provider,  specific staff member): CLINICAL    Do you know the name of the person who called: CLINICAL    What was the call regarding: PT CONFIRMED HE IS OK WITH SWITCHING PRESCRIPTION TO TESTOSTERONE GEL.    PLEASE SEND TO [facility]  [address] [place] [address]# [phone]    PT IS OUT OF MEDICATION AND NEEDS MEDS SENT TODAY IF POSSIBLE.    PLEASE CALL TO CONFIRM.

## 2024-08-09 ENCOUNTER — TELEPHONE (OUTPATIENT)
Dept: UROLOGY | Facility: CLINIC | Age: 54
End: 2024-08-09

## 2024-08-09 DIAGNOSIS — E29.1 HYPOGONADISM MALE: Primary | ICD-10-CM

## 2024-08-09 RX ORDER — TESTOSTERONE GEL, 1% 10 MG/G
100 GEL TRANSDERMAL DAILY
Qty: 60 EACH | Refills: 0 | Status: SHIPPED | OUTPATIENT
Start: 2024-08-09 | End: 2024-09-08

## 2024-08-09 NOTE — TELEPHONE ENCOUNTER
Caller: Selvin Obregon    Relationship: Self    Best call back number: [phone]    Which medication are you concerned about: TESTOSTERONE    Who prescribed you this medication: SHERYL MARRERO    When did you start taking this medication: SENT TO PHARMACY    What are your concerns: PT RECEIVED A MESSAGE FROM [facility] LETTING HIM KNOW THAT INSURANCE NEEDS MORE INFO TO PROCESS THIS MEDICATION . PLEASE ADVISE. THANK YOU

## 2024-08-12 NOTE — TELEPHONE ENCOUNTER
Provider: GRADY GRACE    Caller: Selvin Allen    Relationship to Patient: Self     Phone Number: [phone] (TEXT OR LEAVE MESSAGE, PLEASE. NO CELL SERVICE AT WORK)    Reason for Call: NEEDS MEDICATION FILLED    When was the patient last seen: 05/10/24    Notes: MR. ALLEN STATES THAT [facility] STILL SHOWS THAT THEY NEED MORE INFORMATION REGARDING HIS INSURANCE.     PER PATIENT, PLEASE CALL [facility] DIRECTLY OR [facility] (FORMER PHARMACY) TO CLEAR UP BY END OF DAY AS HE IS HAS BEEN WITHOUT HIS MEDICATION FOR OVER A WEEK.

## 2024-08-12 NOTE — TELEPHONE ENCOUNTER
Lili Left  for patient letting him know his medication was approved and have pharmacy re run through insurance

## 2024-08-12 NOTE — TELEPHONE ENCOUNTER
I did not. He had called after I left on Friday @ 3:15 and I didn't get the chance to call him after I got it approved.

## 2024-08-23 DIAGNOSIS — E29.1 HYPOGONADISM MALE: ICD-10-CM

## 2024-08-26 RX ORDER — TESTOSTERONE GEL, 1% 10 MG/G
GEL TRANSDERMAL
Qty: 300 G | OUTPATIENT
Start: 2024-08-26

## 2024-09-03 DIAGNOSIS — E29.1 HYPOGONADISM MALE: ICD-10-CM

## 2024-09-04 RX ORDER — TESTOSTERONE GEL, 1% 10 MG/G
GEL TRANSDERMAL
Qty: 300 G | Refills: 0 | Status: SHIPPED | OUTPATIENT
Start: 2024-09-04

## 2024-10-14 DIAGNOSIS — E29.1 HYPOGONADISM MALE: ICD-10-CM

## 2024-10-14 RX ORDER — TESTOSTERONE GEL, 1% 10 MG/G
GEL TRANSDERMAL
Qty: 300 G | Refills: 0 | Status: SHIPPED | OUTPATIENT
Start: 2024-10-14

## 2024-10-14 NOTE — TELEPHONE ENCOUNTER
Caller: JOSE HINA    Relationship: SELF    Best call back number: [phone]    Requested Prescriptions: TESTOTERONE  Requested Prescriptions      No prescriptions requested or ordered in this encounter        Pharmacy where request should be sent:  [facility]      Additional details provided by patient: 1 DOSE LEFT PT HAS THIS ISSUE EVERY MONTH  PLEASE REFILL TODAY    Does the patient have less than a 3 day supply:  [x] Yes  [] No    Would you like a call back once the refill request has been completed: [x] Yes [] No    If the office needs to give you a call back, can they leave a voicemail: [x] Yes [] No    Denisse Harris Rep   10/14/24 14:41 EDT

## 2024-10-15 NOTE — TELEPHONE ENCOUNTER
Left vm for patient to give us a call if he had any other questions about his prescription we sent it in yesterday to [facility]

## 2024-10-30 ENCOUNTER — TELEPHONE (OUTPATIENT)
Dept: UROLOGY | Facility: CLINIC | Age: 54
End: 2024-10-30

## 2024-11-13 DIAGNOSIS — E29.1 HYPOGONADISM MALE: ICD-10-CM

## 2024-11-13 RX ORDER — TESTOSTERONE GEL, 1% 10 MG/G
GEL TRANSDERMAL
Qty: 300 G | Refills: 0 | Status: SHIPPED | OUTPATIENT
Start: 2024-11-13

## 2024-12-04 ENCOUNTER — OFFICE VISIT (OUTPATIENT)
Dept: UROLOGY | Facility: CLINIC | Age: 54
End: 2024-12-04
Payer: COMMERCIAL

## 2024-12-04 VITALS
BODY MASS INDEX: 31.15 KG/M2 | HEIGHT: 72 IN | HEART RATE: 89 BPM | OXYGEN SATURATION: 99 % | WEIGHT: 230 LBS | DIASTOLIC BLOOD PRESSURE: 86 MMHG | SYSTOLIC BLOOD PRESSURE: 142 MMHG | TEMPERATURE: 98 F

## 2024-12-04 DIAGNOSIS — E29.1 HYPOGONADISM MALE: Primary | ICD-10-CM

## 2024-12-04 RX ORDER — TESTOSTERONE CYPIONATE 200 MG/ML
100 INJECTION, SOLUTION INTRAMUSCULAR
Qty: 4 ML | Refills: 0 | Status: SHIPPED | OUTPATIENT
Start: 2024-12-04

## 2024-12-04 NOTE — PROGRESS NOTES
"       Office Visit Follow Up      Patient Name: Selvin Obregon  : 1970   MRN: [id number]     Chief Complaint:   Chief Complaint   Patient presents with    Hypogonadism in male     6 month fu       Referring Provider: No ref. provider found    History of Present Illness: Selvin Obregon is a 54 y.o. male who presents today for follow up with hypogonadism      Subjective      Review of System:   As noted in HPI    Medications:     Current Outpatient Medications:     amLODIPine (NORVASC) 5 MG tablet, , Disp: , Rfl:     Cholecalciferol (VITAMIN D3) 5000 UNITS capsule capsule, Take 1 capsule by mouth Daily., Disp: , Rfl:     diclofenac (VOLTAREN) 75 MG EC tablet, PRN, Disp: , Rfl:     lisinopril (PRINIVIL,ZESTRIL) 40 MG tablet, , Disp: , Rfl:     omeprazole (PriLOSEC) 20 MG capsule, Take 1 capsule by mouth Daily., Disp: , Rfl:     Repatha SureClick solution auto-injector SureClick injection, Inject by subcutaneous route for 42 days., Disp: , Rfl:     sildenafil (REVATIO) 20 MG tablet, Do not exceed 5 tabs in 24 hours, Disp: 90 tablet, Rfl: 6    Needle, Disp, 18G X 1-1/2\" misc, Use for drawing up the medication, Disp: 50 each, Rfl: 3    Needle, Disp, 23G X 1-1/2\" misc, Use 1 Device 1 (One) Time Per Week., Disp: 30 each, Rfl: 11    Syringe, Disposable, 3 ML misc, Use 1 syringe 1 (One) Time Per Week., Disp: 100 each, Rfl: 11    Testosterone Cypionate (Depo-Testosterone) 200 MG/ML injection, Inject 0.5 mL into the appropriate muscle as directed by prescriber Every 7 (Seven) Days. Draw sup 0.5 ml and waste remainder of vial, please call for refills with last injection., Disp: 4 mL, Rfl: 0    Allergies:   Allergies   Allergen Reactions    Penicillins Rash       Objective     Physical Exam:   Vital Signs:   Vitals:    24 1120   BP: 142/86   BP Location: Left arm   Patient Position: Sitting   Cuff Size: Adult   Pulse: 89   Temp: 98 °F (36.7 °C)   TempSrc: Temporal   SpO2: 99%   Weight: 104 kg (230 lb)   Height: " "182.9 cm (72\")     Body mass index is 31.19 kg/m².     Physical Exam     Labs:   Brief Urine Lab Results       None                 Lab Results   Component Value Date    GLUCOSE 92 06/16/2020    CALCIUM 9.3 06/16/2020     06/16/2020    K 4.7 06/16/2020    CO2 25.7 06/16/2020     06/16/2020    BUN 11 06/16/2020    CREATININE 1.15 06/16/2020    EGFRIFNONA 68 06/16/2020    BCR 9.6 06/16/2020    ANIONGAP 11.3 06/16/2020       Lab Results   Component Value Date    WBC 5.57 04/13/2022    HGB 14.9 11/21/2024    HCT 46.2 11/21/2024    MCV 81.4 04/13/2022     04/13/2022       Images:   No Images in the past 120 days found..      Assessment / Plan      Assessment/Plan:   Diagnoses and all orders for this visit:    1. Hypogonadism male (Primary)  -     Needle, Disp, 18G X 1-1/2\" misc; Use for drawing up the medication  Dispense: 50 each; Refill: 3  -     Needle, Disp, 23G X 1-1/2\" misc; Use 1 Device 1 (One) Time Per Week.  Dispense: 30 each; Refill: 11  -     Testosterone Cypionate (Depo-Testosterone) 200 MG/ML injection; Inject 0.5 mL into the appropriate muscle as directed by prescriber Every 7 (Seven) Days. Draw sup 0.5 ml and waste remainder of vial, please call for refills with last injection.  Dispense: 4 mL; Refill: 0  -     Syringe, Disposable, 3 ML misc; Use 1 syringe 1 (One) Time Per Week.  Dispense: 100 each; Refill: 11    54-year-old male here to follow-up with hypogonadism he switched from compound testosterone cream to testosterone gel, testosterone is therapeutic but on the lower end he reports he does feel better when testosterone levels are at a higher therapeutic range and is interested in switching to testosterone cypionate.  We discussed proper injection technique, he currently self injects other prescribed medications at home and is comfortable with injections.  Will have patient start testosterone cypionate inject 0.5 mL 1 time weekly and waste remainder of vial.    Total Testosterone: " 441  Hematocrit: 14.9  Hemoglobin: 46.2  PSA: 0.9     Patient reviewed and signed zero tolerance policy, [facility] testosterone therapy policy, and FAQs of testosterone replacement therapy.  All questions were answered and patient agreed to follow policy.  Patient was given signed copies of zero tolerance policy, [facility] testosterone therapy policy, and FAQs of testosterone replacement therapy for reference.      Stop testosterone gel  Start testosterone cypionate 100 mg weekly  Obtain TT, estradiol, hematocrit and hemoglobin 3 months  Recommend starting DIM supplement    Follow Up:   Return in about 3 months (around 3/4/2025) for Follow up GRADY Galloway, NP-C  [facility]

## 2025-01-03 DIAGNOSIS — E29.1 HYPOGONADISM MALE: ICD-10-CM

## 2025-01-03 RX ORDER — TESTOSTERONE CYPIONATE 200 MG/ML
INJECTION, SOLUTION INTRAMUSCULAR
Qty: 4 ML | Refills: 0 | Status: SHIPPED | OUTPATIENT
Start: 2025-01-03

## 2025-01-03 NOTE — TELEPHONE ENCOUNTER
Patient is compliant with the testosterone policy.    Last OV : 12/04/2024    Last Refill: 12/04/2024    Last Labs: 11/21/2024    Refill Due: 01/04/2025    Next Appt: 03/05/2025    [facility]

## 2025-02-02 DIAGNOSIS — E29.1 HYPOGONADISM MALE: ICD-10-CM

## 2025-02-03 NOTE — TELEPHONE ENCOUNTER
Patient is compliant with TRT testosterone replacement policy    Last OV visit 12/0/2024        Last LABS 11/21/2024           Next scheduled OV visit 03/05/2025    Refill due 01/31/2025    [facility]

## 2025-02-04 RX ORDER — TESTOSTERONE CYPIONATE 200 MG/ML
INJECTION, SOLUTION INTRAMUSCULAR
Qty: 4 ML | Refills: 0 | Status: SHIPPED | OUTPATIENT
Start: 2025-02-04

## 2025-03-02 DIAGNOSIS — E29.1 HYPOGONADISM MALE: ICD-10-CM

## 2025-03-03 ENCOUNTER — LAB (OUTPATIENT)
Dept: LAB | Facility: HOSPITAL | Age: 55
End: 2025-03-03
Payer: COMMERCIAL

## 2025-03-03 PROCEDURE — 85018 HEMOGLOBIN: CPT | Performed by: NURSE PRACTITIONER

## 2025-03-03 PROCEDURE — 84403 ASSAY OF TOTAL TESTOSTERONE: CPT | Performed by: NURSE PRACTITIONER

## 2025-03-03 PROCEDURE — 82670 ASSAY OF TOTAL ESTRADIOL: CPT | Performed by: NURSE PRACTITIONER

## 2025-03-03 PROCEDURE — 85014 HEMATOCRIT: CPT | Performed by: NURSE PRACTITIONER

## 2025-03-03 RX ORDER — TESTOSTERONE CYPIONATE 200 MG/ML
INJECTION, SOLUTION INTRAMUSCULAR
Qty: 4 ML | Refills: 0 | Status: SHIPPED | OUTPATIENT
Start: 2025-03-03

## 2025-03-03 NOTE — TELEPHONE ENCOUNTER
Patient is compliant with TRT testosterone replacement policy    Last OV visit  12/04/2024        Last LABS 11/21/2024           Next scheduled OV visit 03/05/2025    Refill due 03/04/2025    [facility]

## 2025-03-05 ENCOUNTER — OFFICE VISIT (OUTPATIENT)
Dept: UROLOGY | Facility: CLINIC | Age: 55
End: 2025-03-05
Payer: COMMERCIAL

## 2025-03-05 VITALS
BODY MASS INDEX: 31.15 KG/M2 | HEIGHT: 72 IN | SYSTOLIC BLOOD PRESSURE: 136 MMHG | HEART RATE: 72 BPM | OXYGEN SATURATION: 100 % | TEMPERATURE: 98.2 F | WEIGHT: 230 LBS | DIASTOLIC BLOOD PRESSURE: 88 MMHG

## 2025-03-05 DIAGNOSIS — E29.1 HYPOGONADISM MALE: Primary | ICD-10-CM

## 2025-03-05 NOTE — PROGRESS NOTES
Office Visit     Patient Name: Selvin Obregon  : 1970   MRN: [id number]     Patient or patient representative verbalized consent for the use of Ambient Listening during the visit with  GRADY Dias for chart documentation. 3/5/2025  10:10 EST    Chief Complaint:   Chief Complaint   Patient presents with    Follow-up     Hypogonadism male         Referring Provider: No ref. provider found    Primary Care Provider: Keiry Mcclellan DO     History of Present Illness  The patient is a 54-year-old male who presents for a follow-up on hypogonadism.    Hypogonadism  - Reports improved well-being since switching from testosterone gel to injections.  - Previously, testosterone levels decreased to ~400 with gel use.  - Concerned about gel transference to wife, noted by her physician's observation of increased testosterone levels.  - No gynecomastia or breast tenderness.    Supplemental information: Donated blood 2 months ago, plans to donate again this month.     Subjective   Review of System:   As noted in HPI.    Past Medical History:   Diagnosis Date    Erectile dysfunction 2014    Hypogonadism in male      History reviewed. No pertinent surgical history.  Family History   Problem Relation Age of Onset    Cancer Mother         Uterine    Heart disease Mother     Hypertension Mother      Social History     Socioeconomic History    Marital status:    Tobacco Use    Smoking status: Never     Passive exposure: Never    Smokeless tobacco: Never   Vaping Use    Vaping status: Never Used   Substance and Sexual Activity    Alcohol use: Yes     Alcohol/week: 8.0 standard drinks of alcohol     Types: 8 Cans of beer per week    Drug use: No    Sexual activity: Yes     Partners: Female     Birth control/protection: Post-menopausal       Current Outpatient Medications:     amLODIPine (NORVASC) 5 MG tablet, , Disp: , Rfl:     Cholecalciferol (VITAMIN D3) 5000 UNITS capsule capsule, Take 1  "capsule by mouth Daily., Disp: , Rfl:     diclofenac (VOLTAREN) 75 MG EC tablet, PRN, Disp: , Rfl:     lisinopril (PRINIVIL,ZESTRIL) 40 MG tablet, , Disp: , Rfl:     Needle, Disp, 18G X 1-1/2\" misc, Use for drawing up the medication, Disp: 50 each, Rfl: 3    Needle, Disp, 23G X 1-1/2\" misc, Use 1 Device 1 (One) Time Per Week., Disp: 30 each, Rfl: 11    omeprazole (PriLOSEC) 20 MG capsule, Take 1 capsule by mouth Daily., Disp: , Rfl:     Repatha SureClick solution auto-injector SureClick injection, Inject by subcutaneous route for 42 days., Disp: , Rfl:     sildenafil (REVATIO) 20 MG tablet, Do not exceed 5 tabs in 24 hours, Disp: 90 tablet, Rfl: 6    Syringe, Disposable, 3 ML misc, Use 1 syringe 1 (One) Time Per Week., Disp: 100 each, Rfl: 11    Testosterone Cypionate (DEPOTESTOTERONE CYPIONATE) 200 MG/ML injection, INJECT 0.5 ML INTO THE APPROPRIATE MUSCLE AS DIRECTED BY PRESCRIBER EVERY 7 DAYS. DEAW UP 0.5 ML AND WASTE REMAINDER OF VIAL, Disp: 4 mL, Rfl: 0    Allergies   Allergen Reactions    Penicillins Rash     Objective   Visit Vitals  /88 (BP Location: Right arm, Patient Position: Sitting, Cuff Size: Adult)   Pulse 72   Temp 98.2 °F (36.8 °C) (Temporal)   Ht 182.9 cm (72.01\")   Wt 104 kg (230 lb)   SpO2 100%   BMI 31.19 kg/m²        Body mass index is 31.19 kg/m².     Physical Exam  Vitals and nursing note reviewed.   Constitutional:       General: He is not in acute distress.     Appearance: Normal appearance. He is normal weight. He is not ill-appearing.   Pulmonary:      Effort: Pulmonary effort is normal. No respiratory distress.   Skin:     General: Skin is warm and dry.   Neurological:      General: No focal deficit present.      Mental Status: He is alert and oriented to person, place, and time.   Psychiatric:         Mood and Affect: Mood normal.         Behavior: Behavior normal.         Labs  Lab Results   Component Value Date    COLORU Yellow 10/28/2022    CLARITYU Clear 10/28/2022    SPECGRAV " "1.020 10/28/2022    PHUR 6.5 10/28/2022    LEUKOCYTESUR Negative 10/28/2022    NITRITE Negative 10/28/2022    PROTEINPOCUA Negative 10/28/2022    GLUCOSEUR Negative 10/28/2022    KETONESU Negative 10/28/2022    UROBILINOGEN Normal 10/28/2022    BILIRUBINUR Negative 10/28/2022    RBCUR Negative 10/28/2022      No results found for: \"WBCUA\", \"RBCUA\", \"BACTERIA\", \"HYALCASTU\", \"SQUAMEPIUA\"     Lab Results   Component Value Date    WBC 5.57 04/13/2022    HGB 14.6 03/03/2025    HCT 45.0 03/03/2025    MCV 81.4 04/13/2022     04/13/2022     Lab Results   Component Value Date    GLUCOSE 92 06/16/2020    CALCIUM 9.3 06/16/2020     06/16/2020    K 4.7 06/16/2020    CO2 25.7 06/16/2020     06/16/2020    BUN 11 06/16/2020    CREATININE 1.15 06/16/2020    BCR 9.6 06/16/2020    ANIONGAP 11.3 06/16/2020    ALT 36 06/16/2020    AST 25 06/16/2020       No results found for: \"HGBA1C\"     Lab Results   Component Value Date    PSA 0.9 11/21/2024    PSA 0.963 11/02/2023    PSA 0.663 10/28/2022       No results found for: \"URICACIDSTN\", \"SKJF3STRNVP\", \"HUOZ3AIXZR\", \"LABMAGN\"  No results found for: \"UMIR02JV\", \"CAION\", \"PTH\", \"URICACID\"  No results found for: \"CYSTINE\", \"URINEVOLUM\", \"CALCIUMUR\", \"OXALATEU\", \"CITRATEUR\", \"LABPH\", \"URICUR\", \"NAUR\", \"KUR\", \"MAGNESIUMUR\", \"PHOSUR\", \"AMMONIUMUR\", \"CLUR\", \"LRFXUHMRW52V\", \"UREAUR\", \"LABCREAUR\"    Lab Results   Component Value Date    TESTOSTEROTT 441 11/21/2024    TESTOSTEROTT 1132 (H) 05/09/2024    TESTOSTEROTT 882 04/27/2023    TESTFRE 28.4 (H) 05/09/2024    PSA 0.9 11/21/2024    ESTRADIOL 45.2 03/03/2025       Lab Results   Component Value Date    TESTOSTEROTT 441 11/21/2024    TESTFRE 28.4 (H) 05/09/2024         Radiographic Studies  No Images in the past 120 days found..    I have reviewed the above labs and imaging.     Assessment / Plan      Diagnoses and all orders for this visit:    1. Hypogonadism male (Primary)  -     Hemoglobin & Hematocrit, Blood; Future  -     " Testosterone; Future  -     PSA DIAGNOSTIC; Future         Assessment & Plan  1. Hypogonadism: Stable. Estrogen: 45.2 (normal, asymptomatic). Testosterone: 956 (satisfactory). Hematocrit: 45.0 (acceptable). Hemoglobin: 14.6 (safe).  - Monitor for gynecomastia or breast tenderness  - Continue regular blood donations to prevent polycythemia  - Order PSA test for next visit recommend annual monitoring  - Repeat testosterone, hematocrit, and hemoglobin in 6 months  - Maintain current medication dosage Tostran cypionate 100 mg weekly  -Continue DIM supplement    Follow-up  - Repeat testosterone, hematocrit, and hemoglobin in 6 months  - Order PSA test for next visit       Return in about 6 months (around 9/5/2025) for Tom De Santiago, MSN, APRN, FNP-C  [facility]

## 2025-03-29 DIAGNOSIS — E29.1 HYPOGONADISM MALE: ICD-10-CM

## 2025-03-31 RX ORDER — TESTOSTERONE CYPIONATE 200 MG/ML
INJECTION, SOLUTION INTRAMUSCULAR
Qty: 4 ML | Refills: 0 | Status: SHIPPED | OUTPATIENT
Start: 2025-03-31

## 2025-04-27 DIAGNOSIS — E29.1 HYPOGONADISM MALE: ICD-10-CM

## 2025-04-28 RX ORDER — TESTOSTERONE CYPIONATE 200 MG/ML
INJECTION, SOLUTION INTRAMUSCULAR
Qty: 4 ML | Refills: 0 | Status: SHIPPED | OUTPATIENT
Start: 2025-04-28

## 2025-04-28 NOTE — TELEPHONE ENCOUNTER
Patient is compliant with TRT testosterone replacement policy     Last OV visit 03/05/2025         Last LABS 03/03/2025            Next scheduled OV visit 09/04/2025     Refill due 04/28/2025     [facility]

## 2025-06-02 DIAGNOSIS — E29.1 HYPOGONADISM MALE: ICD-10-CM

## 2025-06-02 RX ORDER — TESTOSTERONE CYPIONATE 200 MG/ML
100 INJECTION, SOLUTION INTRAMUSCULAR
Qty: 4 ML | Refills: 0 | Status: SHIPPED | OUTPATIENT
Start: 2025-06-02

## 2025-06-02 NOTE — TELEPHONE ENCOUNTER
Patient is compliant with TRT testosterone replacement policy     Last OV visit      03/05/2025     Last LABS        03/03/2025     Next scheduled OV visit 09/04/2025     Refill due 05/26/2025     [facility]

## 2025-06-02 NOTE — TELEPHONE ENCOUNTER
"     Caller: SeavilleSelvin     Relationship: SELF    Best call back number: [phone]     Requested Prescriptions: Testosterone Cypionate (DEPOTESTOTERONE CYPIONATE) 200 MG/ML injection     Needle, Disp, 18G X 1-1/2\" misc     Requested Prescriptions      No prescriptions requested or ordered in this encounter        Pharmacy where request should be sent:  [id number] [address] AT [facility] [phone] [facility] [phone]      Last office visit with prescribing clinician: 3/5/2025   Last telemedicine visit with prescribing clinician: Visit date not found   Next office visit with prescribing clinician: 9/4/2025     Additional details provided by patient: PT MISSED HIS INJECTION ON FRIDAY DUE TO [facility] NOT GETTING AUTHORIZATION FOR REFILL.     Does the patient have less than a 3 day supply:  [x] Yes  [] No    Would you like a call back once the refill request has been completed: [] Yes [x] No    If the office needs to give you a call back, can they leave a voicemail: [] Yes [x] No    Denisse Padgett Rep   06/02/25 08:39 EDT        "

## 2025-06-27 DIAGNOSIS — E29.1 HYPOGONADISM MALE: ICD-10-CM

## 2025-06-30 RX ORDER — TESTOSTERONE CYPIONATE 200 MG/ML
INJECTION, SOLUTION INTRAMUSCULAR
Qty: 4 ML | Refills: 0 | Status: SHIPPED | OUTPATIENT
Start: 2025-06-30

## 2025-07-28 DIAGNOSIS — E29.1 HYPOGONADISM MALE: ICD-10-CM

## 2025-07-29 RX ORDER — TESTOSTERONE CYPIONATE 200 MG/ML
INJECTION, SOLUTION INTRAMUSCULAR
Qty: 4 ML | Refills: 0 | Status: SHIPPED | OUTPATIENT
Start: 2025-07-29

## 2025-07-29 NOTE — TELEPHONE ENCOUNTER
Patient is compliant with TRT testosterone replacement policy     Last OV visit      03/05/2025     Last LABS        03/03/2025     Next scheduled OV visit 09/04/2025     Refill due 07/28/2025     [facility]

## 2025-08-23 DIAGNOSIS — E29.1 HYPOGONADISM MALE: ICD-10-CM

## 2025-08-25 RX ORDER — TESTOSTERONE CYPIONATE 200 MG/ML
INJECTION, SOLUTION INTRAMUSCULAR
Qty: 4 ML | Refills: 0 | Status: SHIPPED | OUTPATIENT
Start: 2025-08-25